# Patient Record
Sex: MALE | Race: WHITE | NOT HISPANIC OR LATINO
[De-identification: names, ages, dates, MRNs, and addresses within clinical notes are randomized per-mention and may not be internally consistent; named-entity substitution may affect disease eponyms.]

---

## 2022-04-18 ENCOUNTER — APPOINTMENT (OUTPATIENT)
Dept: CARDIOLOGY | Facility: CLINIC | Age: 72
End: 2022-04-18
Payer: MEDICARE

## 2022-04-18 ENCOUNTER — NON-APPOINTMENT (OUTPATIENT)
Age: 72
End: 2022-04-18

## 2022-04-18 PROCEDURE — 99205 OFFICE O/P NEW HI 60 MIN: CPT

## 2023-01-01 ENCOUNTER — RESULT REVIEW (OUTPATIENT)
Age: 73
End: 2023-01-01

## 2023-01-01 ENCOUNTER — LABORATORY RESULT (OUTPATIENT)
Age: 73
End: 2023-01-01

## 2023-01-01 ENCOUNTER — APPOINTMENT (OUTPATIENT)
Dept: ULTRASOUND IMAGING | Facility: IMAGING CENTER | Age: 73
End: 2023-01-01
Payer: MEDICARE

## 2023-01-01 ENCOUNTER — OUTPATIENT (OUTPATIENT)
Dept: OUTPATIENT SERVICES | Facility: HOSPITAL | Age: 73
LOS: 1 days | End: 2023-01-01

## 2023-01-01 ENCOUNTER — APPOINTMENT (OUTPATIENT)
Dept: THORACIC SURGERY | Facility: HOSPITAL | Age: 73
End: 2023-01-01

## 2023-01-01 ENCOUNTER — APPOINTMENT (OUTPATIENT)
Dept: HEMATOLOGY ONCOLOGY | Facility: CLINIC | Age: 73
End: 2023-01-01

## 2023-01-01 ENCOUNTER — NON-APPOINTMENT (OUTPATIENT)
Age: 73
End: 2023-01-01

## 2023-01-01 ENCOUNTER — OUTPATIENT (OUTPATIENT)
Dept: OUTPATIENT SERVICES | Facility: HOSPITAL | Age: 73
LOS: 1 days | End: 2023-01-01
Payer: MEDICARE

## 2023-01-01 ENCOUNTER — APPOINTMENT (OUTPATIENT)
Dept: INFUSION THERAPY | Facility: HOSPITAL | Age: 73
End: 2023-01-01

## 2023-01-01 ENCOUNTER — APPOINTMENT (OUTPATIENT)
Dept: OTOLARYNGOLOGY | Facility: CLINIC | Age: 73
End: 2023-01-01
Payer: MEDICARE

## 2023-01-01 ENCOUNTER — APPOINTMENT (OUTPATIENT)
Dept: THORACIC SURGERY | Facility: CLINIC | Age: 73
End: 2023-01-01
Payer: MEDICARE

## 2023-01-01 ENCOUNTER — APPOINTMENT (OUTPATIENT)
Dept: HEMATOLOGY ONCOLOGY | Facility: CLINIC | Age: 73
End: 2023-01-01
Payer: MEDICARE

## 2023-01-01 ENCOUNTER — APPOINTMENT (OUTPATIENT)
Dept: NUCLEAR MEDICINE | Facility: IMAGING CENTER | Age: 73
End: 2023-01-01
Payer: MEDICARE

## 2023-01-01 ENCOUNTER — APPOINTMENT (OUTPATIENT)
Dept: CT IMAGING | Facility: IMAGING CENTER | Age: 73
End: 2023-01-01
Payer: MEDICARE

## 2023-01-01 ENCOUNTER — OUTPATIENT (OUTPATIENT)
Dept: OUTPATIENT SERVICES | Facility: HOSPITAL | Age: 73
LOS: 1 days | Discharge: HOME | End: 2023-01-01
Payer: MEDICARE

## 2023-01-01 ENCOUNTER — TRANSCRIPTION ENCOUNTER (OUTPATIENT)
Age: 73
End: 2023-01-01

## 2023-01-01 ENCOUNTER — APPOINTMENT (OUTPATIENT)
Dept: MRI IMAGING | Facility: IMAGING CENTER | Age: 73
End: 2023-01-01
Payer: MEDICARE

## 2023-01-01 ENCOUNTER — APPOINTMENT (OUTPATIENT)
Dept: PULMONOLOGY | Facility: CLINIC | Age: 73
End: 2023-01-01
Payer: MEDICARE

## 2023-01-01 ENCOUNTER — APPOINTMENT (OUTPATIENT)
Dept: RADIATION ONCOLOGY | Facility: CLINIC | Age: 73
End: 2023-01-01
Payer: MEDICARE

## 2023-01-01 ENCOUNTER — APPOINTMENT (OUTPATIENT)
Dept: CARDIOLOGY | Facility: CLINIC | Age: 73
End: 2023-01-01
Payer: MEDICARE

## 2023-01-01 VITALS
WEIGHT: 155 LBS | HEIGHT: 70 IN | HEART RATE: 127 BPM | OXYGEN SATURATION: 92 % | DIASTOLIC BLOOD PRESSURE: 59 MMHG | SYSTOLIC BLOOD PRESSURE: 109 MMHG | BODY MASS INDEX: 22.19 KG/M2 | RESPIRATION RATE: 17 BRPM

## 2023-01-01 VITALS
TEMPERATURE: 97.9 F | BODY MASS INDEX: 20.58 KG/M2 | HEART RATE: 96 BPM | OXYGEN SATURATION: 98 % | WEIGHT: 155.31 LBS | DIASTOLIC BLOOD PRESSURE: 77 MMHG | RESPIRATION RATE: 18 BRPM | HEIGHT: 73 IN | SYSTOLIC BLOOD PRESSURE: 127 MMHG

## 2023-01-01 VITALS
HEIGHT: 72 IN | SYSTOLIC BLOOD PRESSURE: 102 MMHG | TEMPERATURE: 98 F | RESPIRATION RATE: 16 BRPM | OXYGEN SATURATION: 98 % | DIASTOLIC BLOOD PRESSURE: 67 MMHG | HEART RATE: 99 BPM | WEIGHT: 149.91 LBS

## 2023-01-01 VITALS
DIASTOLIC BLOOD PRESSURE: 67 MMHG | WEIGHT: 152.12 LBS | HEART RATE: 108 BPM | SYSTOLIC BLOOD PRESSURE: 111 MMHG | RESPIRATION RATE: 18 BRPM | OXYGEN SATURATION: 91 % | BODY MASS INDEX: 21.83 KG/M2 | TEMPERATURE: 97.9 F

## 2023-01-01 VITALS
WEIGHT: 151.4 LBS | SYSTOLIC BLOOD PRESSURE: 101 MMHG | BODY MASS INDEX: 21.67 KG/M2 | DIASTOLIC BLOOD PRESSURE: 65 MMHG | HEART RATE: 109 BPM | OXYGEN SATURATION: 93 % | HEIGHT: 70 IN

## 2023-01-01 VITALS
HEART RATE: 123 BPM | BODY MASS INDEX: 21.55 KG/M2 | WEIGHT: 150.55 LBS | RESPIRATION RATE: 16 BRPM | TEMPERATURE: 98.1 F | OXYGEN SATURATION: 93 % | DIASTOLIC BLOOD PRESSURE: 63 MMHG | SYSTOLIC BLOOD PRESSURE: 100 MMHG | HEIGHT: 70 IN

## 2023-01-01 VITALS
TEMPERATURE: 98.2 F | BODY MASS INDEX: 20.99 KG/M2 | OXYGEN SATURATION: 95 % | HEIGHT: 72 IN | HEART RATE: 101 BPM | SYSTOLIC BLOOD PRESSURE: 126 MMHG | WEIGHT: 155 LBS | DIASTOLIC BLOOD PRESSURE: 78 MMHG

## 2023-01-01 VITALS
TEMPERATURE: 98.4 F | HEART RATE: 107 BPM | OXYGEN SATURATION: 95 % | SYSTOLIC BLOOD PRESSURE: 137 MMHG | RESPIRATION RATE: 18 BRPM | BODY MASS INDEX: 21.08 KG/M2 | WEIGHT: 155.42 LBS | DIASTOLIC BLOOD PRESSURE: 83 MMHG

## 2023-01-01 VITALS
HEART RATE: 105 BPM | WEIGHT: 155 LBS | HEIGHT: 70 IN | BODY MASS INDEX: 22.19 KG/M2 | SYSTOLIC BLOOD PRESSURE: 106 MMHG | DIASTOLIC BLOOD PRESSURE: 65 MMHG | OXYGEN SATURATION: 90 % | RESPIRATION RATE: 16 BRPM

## 2023-01-01 VITALS
SYSTOLIC BLOOD PRESSURE: 112 MMHG | OXYGEN SATURATION: 98 % | RESPIRATION RATE: 16 BRPM | TEMPERATURE: 98 F | DIASTOLIC BLOOD PRESSURE: 72 MMHG | WEIGHT: 154.98 LBS | HEIGHT: 70.5 IN | HEART RATE: 100 BPM

## 2023-01-01 VITALS
OXYGEN SATURATION: 95 % | BODY MASS INDEX: 21.8 KG/M2 | SYSTOLIC BLOOD PRESSURE: 153 MMHG | TEMPERATURE: 98.1 F | WEIGHT: 154 LBS | DIASTOLIC BLOOD PRESSURE: 82 MMHG | HEIGHT: 70.47 IN | RESPIRATION RATE: 16 BRPM | HEART RATE: 96 BPM

## 2023-01-01 VITALS
WEIGHT: 157 LBS | HEIGHT: 72 IN | HEART RATE: 108 BPM | SYSTOLIC BLOOD PRESSURE: 123 MMHG | OXYGEN SATURATION: 92 % | RESPIRATION RATE: 18 BRPM | DIASTOLIC BLOOD PRESSURE: 72 MMHG | BODY MASS INDEX: 21.26 KG/M2 | TEMPERATURE: 97.7 F

## 2023-01-01 VITALS — HEART RATE: 107 BPM

## 2023-01-01 DIAGNOSIS — R91.1 SOLITARY PULMONARY NODULE: ICD-10-CM

## 2023-01-01 DIAGNOSIS — R22.1 LOCALIZED SWELLING, MASS AND LUMP, NECK: ICD-10-CM

## 2023-01-01 DIAGNOSIS — Z51.11 ENCOUNTER FOR ANTINEOPLASTIC CHEMOTHERAPY: ICD-10-CM

## 2023-01-01 DIAGNOSIS — R91.8 OTHER NONSPECIFIC ABNORMAL FINDING OF LUNG FIELD: ICD-10-CM

## 2023-01-01 DIAGNOSIS — J44.9 CHRONIC OBSTRUCTIVE PULMONARY DISEASE, UNSPECIFIED: ICD-10-CM

## 2023-01-01 DIAGNOSIS — Z00.00 ENCOUNTER FOR GENERAL ADULT MEDICAL EXAMINATION W/OUT ABNORMAL FINDINGS: ICD-10-CM

## 2023-01-01 DIAGNOSIS — Z98.890 OTHER SPECIFIED POSTPROCEDURAL STATES: Chronic | ICD-10-CM

## 2023-01-01 DIAGNOSIS — Z90.49 ACQUIRED ABSENCE OF OTHER SPECIFIED PARTS OF DIGESTIVE TRACT: Chronic | ICD-10-CM

## 2023-01-01 DIAGNOSIS — C80.1 MALIGNANT (PRIMARY) NEOPLASM, UNSPECIFIED: ICD-10-CM

## 2023-01-01 DIAGNOSIS — Z87.09 PERSONAL HISTORY OF OTHER DISEASES OF THE RESPIRATORY SYSTEM: ICD-10-CM

## 2023-01-01 DIAGNOSIS — F17.200 NICOTINE DEPENDENCE, UNSPECIFIED, UNCOMPLICATED: ICD-10-CM

## 2023-01-01 DIAGNOSIS — M54.9 DORSALGIA, UNSPECIFIED: ICD-10-CM

## 2023-01-01 DIAGNOSIS — G47.33 OBSTRUCTIVE SLEEP APNEA (ADULT) (PEDIATRIC): ICD-10-CM

## 2023-01-01 DIAGNOSIS — E86.0 DEHYDRATION: ICD-10-CM

## 2023-01-01 DIAGNOSIS — C07 MALIGNANT NEOPLASM OF PAROTID GLAND: ICD-10-CM

## 2023-01-01 DIAGNOSIS — Z51.89 ENCOUNTER FOR OTHER SPECIFIED AFTERCARE: ICD-10-CM

## 2023-01-01 DIAGNOSIS — R11.2 NAUSEA WITH VOMITING, UNSPECIFIED: ICD-10-CM

## 2023-01-01 DIAGNOSIS — R94.31 ABNORMAL ELECTROCARDIOGRAM [ECG] [EKG]: ICD-10-CM

## 2023-01-01 DIAGNOSIS — E78.5 HYPERLIPIDEMIA, UNSPECIFIED: ICD-10-CM

## 2023-01-01 DIAGNOSIS — Z80.3 FAMILY HISTORY OF MALIGNANT NEOPLASM OF BREAST: ICD-10-CM

## 2023-01-01 LAB
ALBUMIN SERPL ELPH-MCNC: 2.7 G/DL
ALBUMIN SERPL ELPH-MCNC: 2.7 G/DL — LOW (ref 3.3–5)
ALBUMIN SERPL ELPH-MCNC: 2.7 G/DL — LOW (ref 3.3–5)
ALBUMIN SERPL ELPH-MCNC: 2.9 G/DL — LOW (ref 3.3–5)
ALBUMIN SERPL ELPH-MCNC: 2.9 G/DL — LOW (ref 3.3–5)
ALBUMIN SERPL ELPH-MCNC: 3 G/DL — LOW (ref 3.3–5)
ALBUMIN SERPL ELPH-MCNC: 3 G/DL — LOW (ref 3.3–5)
ALBUMIN SERPL ELPH-MCNC: 3.5 G/DL — SIGNIFICANT CHANGE UP (ref 3.3–5)
ALBUMIN SERPL ELPH-MCNC: 3.6 G/DL — SIGNIFICANT CHANGE UP (ref 3.3–5)
ALBUMIN SERPL ELPH-MCNC: 3.6 G/DL — SIGNIFICANT CHANGE UP (ref 3.3–5)
ALBUMIN SERPL ELPH-MCNC: 3.7 G/DL — SIGNIFICANT CHANGE UP (ref 3.3–5)
ALBUMIN SERPL ELPH-MCNC: 3.7 G/DL — SIGNIFICANT CHANGE UP (ref 3.3–5)
ALBUMIN SERPL ELPH-MCNC: 3.8 G/DL — SIGNIFICANT CHANGE UP (ref 3.3–5)
ALBUMIN SERPL ELPH-MCNC: 3.9 G/DL
ALBUMIN SERPL ELPH-MCNC: 4 G/DL — SIGNIFICANT CHANGE UP (ref 3.3–5)
ALP BLD-CCNC: 89 U/L
ALP BLD-CCNC: 92 U/L
ALP SERPL-CCNC: 113 U/L — SIGNIFICANT CHANGE UP (ref 40–120)
ALP SERPL-CCNC: 113 U/L — SIGNIFICANT CHANGE UP (ref 40–120)
ALP SERPL-CCNC: 115 U/L — SIGNIFICANT CHANGE UP (ref 40–120)
ALP SERPL-CCNC: 115 U/L — SIGNIFICANT CHANGE UP (ref 40–120)
ALP SERPL-CCNC: 121 U/L — HIGH (ref 40–120)
ALP SERPL-CCNC: 121 U/L — HIGH (ref 40–120)
ALP SERPL-CCNC: 123 U/L — HIGH (ref 40–120)
ALP SERPL-CCNC: 129 U/L — HIGH (ref 40–120)
ALP SERPL-CCNC: 129 U/L — HIGH (ref 40–120)
ALP SERPL-CCNC: 82 U/L — SIGNIFICANT CHANGE UP (ref 40–120)
ALP SERPL-CCNC: 84 U/L — SIGNIFICANT CHANGE UP (ref 40–120)
ALT FLD-CCNC: 14 U/L — SIGNIFICANT CHANGE UP (ref 10–45)
ALT FLD-CCNC: 15 U/L — SIGNIFICANT CHANGE UP (ref 10–45)
ALT FLD-CCNC: 15 U/L — SIGNIFICANT CHANGE UP (ref 4–41)
ALT FLD-CCNC: 15 U/L — SIGNIFICANT CHANGE UP (ref 4–41)
ALT FLD-CCNC: 16 U/L — SIGNIFICANT CHANGE UP (ref 10–45)
ALT FLD-CCNC: 17 U/L — SIGNIFICANT CHANGE UP (ref 10–45)
ALT FLD-CCNC: 17 U/L — SIGNIFICANT CHANGE UP (ref 10–45)
ALT FLD-CCNC: 22 U/L — SIGNIFICANT CHANGE UP (ref 4–41)
ALT FLD-CCNC: 22 U/L — SIGNIFICANT CHANGE UP (ref 4–41)
ALT FLD-CCNC: 30 U/L — SIGNIFICANT CHANGE UP (ref 10–45)
ALT FLD-CCNC: 30 U/L — SIGNIFICANT CHANGE UP (ref 10–45)
ALT FLD-CCNC: 31 U/L — SIGNIFICANT CHANGE UP (ref 10–45)
ALT FLD-CCNC: 31 U/L — SIGNIFICANT CHANGE UP (ref 10–45)
ALT SERPL-CCNC: 15 U/L
ALT SERPL-CCNC: 22 U/L
ANION GAP SERPL CALC-SCNC: 10 MMOL/L — SIGNIFICANT CHANGE UP (ref 7–14)
ANION GAP SERPL CALC-SCNC: 10 MMOL/L — SIGNIFICANT CHANGE UP (ref 7–14)
ANION GAP SERPL CALC-SCNC: 11 MMOL/L
ANION GAP SERPL CALC-SCNC: 11 MMOL/L — SIGNIFICANT CHANGE UP (ref 7–14)
ANION GAP SERPL CALC-SCNC: 11 MMOL/L — SIGNIFICANT CHANGE UP (ref 7–14)
ANION GAP SERPL CALC-SCNC: 12 MMOL/L
ANION GAP SERPL CALC-SCNC: 12 MMOL/L — SIGNIFICANT CHANGE UP (ref 5–17)
ANION GAP SERPL CALC-SCNC: 12 MMOL/L — SIGNIFICANT CHANGE UP (ref 5–17)
ANION GAP SERPL CALC-SCNC: 13 MMOL/L — SIGNIFICANT CHANGE UP (ref 5–17)
ANION GAP SERPL CALC-SCNC: 13 MMOL/L — SIGNIFICANT CHANGE UP (ref 5–17)
ANION GAP SERPL CALC-SCNC: 8 MMOL/L — SIGNIFICANT CHANGE UP (ref 5–17)
ANION GAP SERPL CALC-SCNC: 9 MMOL/L — SIGNIFICANT CHANGE UP (ref 5–17)
ANION GAP SERPL CALC-SCNC: 9 MMOL/L — SIGNIFICANT CHANGE UP (ref 5–17)
AST SERPL-CCNC: 20 U/L — SIGNIFICANT CHANGE UP (ref 4–40)
AST SERPL-CCNC: 20 U/L — SIGNIFICANT CHANGE UP (ref 4–40)
AST SERPL-CCNC: 24 U/L — SIGNIFICANT CHANGE UP (ref 4–40)
AST SERPL-CCNC: 24 U/L — SIGNIFICANT CHANGE UP (ref 4–40)
AST SERPL-CCNC: 25 U/L
AST SERPL-CCNC: 29 U/L — SIGNIFICANT CHANGE UP (ref 10–40)
AST SERPL-CCNC: 31 U/L
AST SERPL-CCNC: 33 U/L — SIGNIFICANT CHANGE UP (ref 10–40)
AST SERPL-CCNC: 35 U/L — SIGNIFICANT CHANGE UP (ref 10–40)
AST SERPL-CCNC: 35 U/L — SIGNIFICANT CHANGE UP (ref 10–40)
AST SERPL-CCNC: 36 U/L — SIGNIFICANT CHANGE UP (ref 10–40)
AST SERPL-CCNC: 36 U/L — SIGNIFICANT CHANGE UP (ref 10–40)
AST SERPL-CCNC: 45 U/L — HIGH (ref 10–40)
BASOPHILS # BLD AUTO: 0 K/UL — SIGNIFICANT CHANGE UP (ref 0–0.2)
BASOPHILS # BLD AUTO: 0.02 K/UL — SIGNIFICANT CHANGE UP (ref 0–0.2)
BASOPHILS # BLD AUTO: 0.03 K/UL — SIGNIFICANT CHANGE UP (ref 0–0.2)
BASOPHILS # BLD AUTO: 0.06 K/UL — SIGNIFICANT CHANGE UP (ref 0–0.2)
BASOPHILS # BLD AUTO: 0.06 K/UL — SIGNIFICANT CHANGE UP (ref 0–0.2)
BASOPHILS NFR BLD AUTO: 0 % — SIGNIFICANT CHANGE UP (ref 0–2)
BASOPHILS NFR BLD AUTO: 0.2 % — SIGNIFICANT CHANGE UP (ref 0–2)
BASOPHILS NFR BLD AUTO: 0.3 % — SIGNIFICANT CHANGE UP (ref 0–2)
BASOPHILS NFR BLD AUTO: 0.4 % — SIGNIFICANT CHANGE UP (ref 0–2)
BASOPHILS NFR BLD AUTO: 0.4 % — SIGNIFICANT CHANGE UP (ref 0–2)
BILIRUB DIRECT SERPL-MCNC: 0.1 MG/DL — SIGNIFICANT CHANGE UP (ref 0–0.3)
BILIRUB DIRECT SERPL-MCNC: 0.2 MG/DL — SIGNIFICANT CHANGE UP (ref 0–0.3)
BILIRUB DIRECT SERPL-MCNC: 0.2 MG/DL — SIGNIFICANT CHANGE UP (ref 0–0.3)
BILIRUB INDIRECT FLD-MCNC: 0.1 MG/DL — LOW (ref 0.2–1.2)
BILIRUB SERPL-MCNC: 0.2 MG/DL
BILIRUB SERPL-MCNC: 0.2 MG/DL — SIGNIFICANT CHANGE UP (ref 0.2–1.2)
BILIRUB SERPL-MCNC: 0.3 MG/DL — SIGNIFICANT CHANGE UP (ref 0.2–1.2)
BILIRUB SERPL-MCNC: 0.4 MG/DL
BILIRUB SERPL-MCNC: 0.4 MG/DL — SIGNIFICANT CHANGE UP (ref 0.2–1.2)
BILIRUB SERPL-MCNC: 0.4 MG/DL — SIGNIFICANT CHANGE UP (ref 0.2–1.2)
BLD GP AB SCN SERPL QL: NEGATIVE — SIGNIFICANT CHANGE UP
BUN SERPL-MCNC: 17 MG/DL — SIGNIFICANT CHANGE UP (ref 7–23)
BUN SERPL-MCNC: 17 MG/DL — SIGNIFICANT CHANGE UP (ref 7–23)
BUN SERPL-MCNC: 18 MG/DL
BUN SERPL-MCNC: 18 MG/DL — SIGNIFICANT CHANGE UP (ref 7–23)
BUN SERPL-MCNC: 18 MG/DL — SIGNIFICANT CHANGE UP (ref 7–23)
BUN SERPL-MCNC: 21 MG/DL — SIGNIFICANT CHANGE UP (ref 7–23)
BUN SERPL-MCNC: 30 MG/DL — HIGH (ref 7–23)
BUN SERPL-MCNC: 31 MG/DL
BUN SERPL-MCNC: 38 MG/DL — HIGH (ref 7–23)
BUN SERPL-MCNC: 38 MG/DL — HIGH (ref 7–23)
BURR CELLS BLD QL SMEAR: PRESENT — SIGNIFICANT CHANGE UP
BURR CELLS BLD QL SMEAR: PRESENT — SIGNIFICANT CHANGE UP
CALCIT SERPL-MCNC: 4.2 PG/ML — SIGNIFICANT CHANGE UP
CALCIT SERPL-MCNC: 4.2 PG/ML — SIGNIFICANT CHANGE UP
CALCIUM SERPL-MCNC: 8.5 MG/DL
CALCIUM SERPL-MCNC: 8.7 MG/DL — SIGNIFICANT CHANGE UP (ref 8.4–10.5)
CALCIUM SERPL-MCNC: 8.7 MG/DL — SIGNIFICANT CHANGE UP (ref 8.4–10.5)
CALCIUM SERPL-MCNC: 8.8 MG/DL — SIGNIFICANT CHANGE UP (ref 8.4–10.5)
CALCIUM SERPL-MCNC: 8.8 MG/DL — SIGNIFICANT CHANGE UP (ref 8.4–10.5)
CALCIUM SERPL-MCNC: 8.9 MG/DL — SIGNIFICANT CHANGE UP (ref 8.4–10.5)
CALCIUM SERPL-MCNC: 8.9 MG/DL — SIGNIFICANT CHANGE UP (ref 8.4–10.5)
CALCIUM SERPL-MCNC: 9 MG/DL — SIGNIFICANT CHANGE UP (ref 8.4–10.5)
CALCIUM SERPL-MCNC: 9 MG/DL — SIGNIFICANT CHANGE UP (ref 8.4–10.5)
CALCIUM SERPL-MCNC: 9.1 MG/DL
CALCIUM SERPL-MCNC: 9.2 MG/DL — SIGNIFICANT CHANGE UP (ref 8.4–10.5)
CALCIUM SERPL-MCNC: 9.3 MG/DL — SIGNIFICANT CHANGE UP (ref 8.4–10.5)
CALCIUM SERPL-MCNC: 9.3 MG/DL — SIGNIFICANT CHANGE UP (ref 8.4–10.5)
CEA SERPL-MCNC: 2.3 NG/ML — SIGNIFICANT CHANGE UP (ref 0–3.8)
CEA SERPL-MCNC: 2.7 NG/ML — SIGNIFICANT CHANGE UP (ref 0–3.8)
CEA SERPL-MCNC: 2.7 NG/ML — SIGNIFICANT CHANGE UP (ref 0–3.8)
CEA SERPL-MCNC: 2.9 NG/ML
CEA SERPL-MCNC: 3 NG/ML — SIGNIFICANT CHANGE UP (ref 0–3.8)
CEA SERPL-MCNC: 3 NG/ML — SIGNIFICANT CHANGE UP (ref 0–3.8)
CHLORIDE SERPL-SCNC: 102 MMOL/L
CHLORIDE SERPL-SCNC: 103 MMOL/L — SIGNIFICANT CHANGE UP (ref 96–108)
CHLORIDE SERPL-SCNC: 104 MMOL/L — SIGNIFICANT CHANGE UP (ref 96–108)
CHLORIDE SERPL-SCNC: 104 MMOL/L — SIGNIFICANT CHANGE UP (ref 96–108)
CHLORIDE SERPL-SCNC: 104 MMOL/L — SIGNIFICANT CHANGE UP (ref 98–107)
CHLORIDE SERPL-SCNC: 104 MMOL/L — SIGNIFICANT CHANGE UP (ref 98–107)
CHLORIDE SERPL-SCNC: 105 MMOL/L — SIGNIFICANT CHANGE UP (ref 96–108)
CHLORIDE SERPL-SCNC: 97 MMOL/L
CHLORIDE SERPL-SCNC: 98 MMOL/L — SIGNIFICANT CHANGE UP (ref 98–107)
CHLORIDE SERPL-SCNC: 98 MMOL/L — SIGNIFICANT CHANGE UP (ref 98–107)
CHROM ANALY INTERPHASE BLD FISH-IMP: SIGNIFICANT CHANGE UP
CHROM ANALY INTERPHASE BLD FISH-IMP: SIGNIFICANT CHANGE UP
CO2 SERPL-SCNC: 21 MMOL/L — LOW (ref 22–31)
CO2 SERPL-SCNC: 22 MMOL/L
CO2 SERPL-SCNC: 23 MMOL/L
CO2 SERPL-SCNC: 23 MMOL/L — SIGNIFICANT CHANGE UP (ref 22–31)
CO2 SERPL-SCNC: 24 MMOL/L — SIGNIFICANT CHANGE UP (ref 22–31)
CREAT SERPL-MCNC: 1.04 MG/DL — SIGNIFICANT CHANGE UP (ref 0.5–1.3)
CREAT SERPL-MCNC: 1.04 MG/DL — SIGNIFICANT CHANGE UP (ref 0.5–1.3)
CREAT SERPL-MCNC: 1.23 MG/DL — SIGNIFICANT CHANGE UP (ref 0.5–1.3)
CREAT SERPL-MCNC: 1.26 MG/DL
CREAT SERPL-MCNC: 1.27 MG/DL — SIGNIFICANT CHANGE UP (ref 0.5–1.3)
CREAT SERPL-MCNC: 1.27 MG/DL — SIGNIFICANT CHANGE UP (ref 0.5–1.3)
CREAT SERPL-MCNC: 1.29 MG/DL — SIGNIFICANT CHANGE UP (ref 0.5–1.3)
CREAT SERPL-MCNC: 1.29 MG/DL — SIGNIFICANT CHANGE UP (ref 0.5–1.3)
CREAT SERPL-MCNC: 1.44 MG/DL
CREAT SERPL-MCNC: 1.46 MG/DL — HIGH (ref 0.5–1.3)
CREAT SERPL-MCNC: 1.46 MG/DL — HIGH (ref 0.5–1.3)
CREAT SERPL-MCNC: 1.5 MG/DL — HIGH (ref 0.5–1.3)
CREAT SERPL-MCNC: 1.5 MG/DL — HIGH (ref 0.5–1.3)
EGFR: 49 ML/MIN/1.73M2 — LOW
EGFR: 49 ML/MIN/1.73M2 — LOW
EGFR: 50 ML/MIN/1.73M2 — LOW
EGFR: 50 ML/MIN/1.73M2 — LOW
EGFR: 51 ML/MIN/1.73M2
EGFR: 59 ML/MIN/1.73M2 — LOW
EGFR: 59 ML/MIN/1.73M2 — LOW
EGFR: 60 ML/MIN/1.73M2
EGFR: 60 ML/MIN/1.73M2 — SIGNIFICANT CHANGE UP
EGFR: 60 ML/MIN/1.73M2 — SIGNIFICANT CHANGE UP
EGFR: 62 ML/MIN/1.73M2 — SIGNIFICANT CHANGE UP
EGFR: 76 ML/MIN/1.73M2 — SIGNIFICANT CHANGE UP
EGFR: 76 ML/MIN/1.73M2 — SIGNIFICANT CHANGE UP
EOSINOPHIL # BLD AUTO: 0 K/UL — SIGNIFICANT CHANGE UP (ref 0–0.5)
EOSINOPHIL # BLD AUTO: 0 K/UL — SIGNIFICANT CHANGE UP (ref 0–0.5)
EOSINOPHIL # BLD AUTO: 0.02 K/UL — SIGNIFICANT CHANGE UP (ref 0–0.5)
EOSINOPHIL # BLD AUTO: 0.02 K/UL — SIGNIFICANT CHANGE UP (ref 0–0.5)
EOSINOPHIL # BLD AUTO: 0.12 K/UL — SIGNIFICANT CHANGE UP (ref 0–0.5)
EOSINOPHIL # BLD AUTO: 0.13 K/UL — SIGNIFICANT CHANGE UP (ref 0–0.5)
EOSINOPHIL # BLD AUTO: 0.14 K/UL — SIGNIFICANT CHANGE UP (ref 0–0.5)
EOSINOPHIL # BLD AUTO: 0.14 K/UL — SIGNIFICANT CHANGE UP (ref 0–0.5)
EOSINOPHIL # BLD AUTO: 0.25 K/UL — SIGNIFICANT CHANGE UP (ref 0–0.5)
EOSINOPHIL # BLD AUTO: 0.25 K/UL — SIGNIFICANT CHANGE UP (ref 0–0.5)
EOSINOPHIL NFR BLD AUTO: 0 % — SIGNIFICANT CHANGE UP (ref 0–6)
EOSINOPHIL NFR BLD AUTO: 0 % — SIGNIFICANT CHANGE UP (ref 0–6)
EOSINOPHIL NFR BLD AUTO: 1 % — SIGNIFICANT CHANGE UP (ref 0–6)
EOSINOPHIL NFR BLD AUTO: 1 % — SIGNIFICANT CHANGE UP (ref 0–6)
EOSINOPHIL NFR BLD AUTO: 1.2 % — SIGNIFICANT CHANGE UP (ref 0–6)
EOSINOPHIL NFR BLD AUTO: 1.2 % — SIGNIFICANT CHANGE UP (ref 0–6)
EOSINOPHIL NFR BLD AUTO: 1.4 % — SIGNIFICANT CHANGE UP (ref 0–6)
EOSINOPHIL NFR BLD AUTO: 1.6 % — SIGNIFICANT CHANGE UP (ref 0–6)
GLUCOSE SERPL-MCNC: 100 MG/DL — HIGH (ref 70–99)
GLUCOSE SERPL-MCNC: 100 MG/DL — HIGH (ref 70–99)
GLUCOSE SERPL-MCNC: 107 MG/DL
GLUCOSE SERPL-MCNC: 121 MG/DL — HIGH (ref 70–99)
GLUCOSE SERPL-MCNC: 121 MG/DL — HIGH (ref 70–99)
GLUCOSE SERPL-MCNC: 53 MG/DL — CRITICAL LOW (ref 70–99)
GLUCOSE SERPL-MCNC: 53 MG/DL — CRITICAL LOW (ref 70–99)
GLUCOSE SERPL-MCNC: 75 MG/DL — SIGNIFICANT CHANGE UP (ref 70–99)
GLUCOSE SERPL-MCNC: 76 MG/DL — SIGNIFICANT CHANGE UP (ref 70–99)
GLUCOSE SERPL-MCNC: 76 MG/DL — SIGNIFICANT CHANGE UP (ref 70–99)
GLUCOSE SERPL-MCNC: 85 MG/DL
GLUCOSE SERPL-MCNC: 92 MG/DL — SIGNIFICANT CHANGE UP (ref 70–99)
GLUCOSE SERPL-MCNC: 92 MG/DL — SIGNIFICANT CHANGE UP (ref 70–99)
GLUCOSE SERPL-MCNC: 93 MG/DL — SIGNIFICANT CHANGE UP (ref 70–99)
GLUCOSE SERPL-MCNC: 93 MG/DL — SIGNIFICANT CHANGE UP (ref 70–99)
HBV CORE AB SER-ACNC: SIGNIFICANT CHANGE UP
HBV DNA # SERPL NAA+PROBE: SIGNIFICANT CHANGE UP
HBV DNA # SERPL NAA+PROBE: SIGNIFICANT CHANGE UP
HBV DNA SERPL NAA+PROBE-LOG#: SIGNIFICANT CHANGE UP LOGIU/ML
HBV DNA SERPL NAA+PROBE-LOG#: SIGNIFICANT CHANGE UP LOGIU/ML
HBV SURFACE AB SER-ACNC: SIGNIFICANT CHANGE UP
HBV SURFACE AG SER-ACNC: ABNORMAL
HCT VFR BLD CALC: 21.5 % — LOW (ref 39–50)
HCT VFR BLD CALC: 21.5 % — LOW (ref 39–50)
HCT VFR BLD CALC: 24 % — LOW (ref 39–50)
HCT VFR BLD CALC: 24 % — LOW (ref 39–50)
HCT VFR BLD CALC: 25.3 % — LOW (ref 39–50)
HCT VFR BLD CALC: 25.3 % — LOW (ref 39–50)
HCT VFR BLD CALC: 27.5 % — LOW (ref 39–50)
HCT VFR BLD CALC: 27.5 % — LOW (ref 39–50)
HCT VFR BLD CALC: 31 % — LOW (ref 39–50)
HCT VFR BLD CALC: 31 % — LOW (ref 39–50)
HCT VFR BLD CALC: 32 % — LOW (ref 39–50)
HCT VFR BLD CALC: 32 % — LOW (ref 39–50)
HCT VFR BLD CALC: 36.9 % — LOW (ref 39–50)
HCT VFR BLD CALC: 37.9 % — LOW (ref 39–50)
HCV AB S/CO SERPL IA: 0.39 S/CO — SIGNIFICANT CHANGE UP (ref 0–0.99)
HCV AB SERPL-IMP: SIGNIFICANT CHANGE UP
HGB BLD-MCNC: 10.3 G/DL — LOW (ref 13–17)
HGB BLD-MCNC: 10.3 G/DL — LOW (ref 13–17)
HGB BLD-MCNC: 10.4 G/DL — LOW (ref 13–17)
HGB BLD-MCNC: 10.4 G/DL — LOW (ref 13–17)
HGB BLD-MCNC: 11.8 G/DL — LOW (ref 13–17)
HGB BLD-MCNC: 12.3 G/DL — LOW (ref 13–17)
HGB BLD-MCNC: 7.3 G/DL — LOW (ref 13–17)
HGB BLD-MCNC: 7.3 G/DL — LOW (ref 13–17)
HGB BLD-MCNC: 7.9 G/DL — LOW (ref 13–17)
HGB BLD-MCNC: 7.9 G/DL — LOW (ref 13–17)
HGB BLD-MCNC: 8.2 G/DL — LOW (ref 13–17)
HGB BLD-MCNC: 8.2 G/DL — LOW (ref 13–17)
HGB BLD-MCNC: 9.4 G/DL — LOW (ref 13–17)
HGB BLD-MCNC: 9.4 G/DL — LOW (ref 13–17)
IMM GRANULOCYTES NFR BLD AUTO: 0.2 % — SIGNIFICANT CHANGE UP (ref 0–0.9)
IMM GRANULOCYTES NFR BLD AUTO: 0.5 % — SIGNIFICANT CHANGE UP (ref 0–0.9)
IMM GRANULOCYTES NFR BLD AUTO: 1.7 % — HIGH (ref 0–0.9)
IMM GRANULOCYTES NFR BLD AUTO: 1.7 % — HIGH (ref 0–0.9)
IMM GRANULOCYTES NFR BLD AUTO: 1.8 % — HIGH (ref 0–0.9)
IMM GRANULOCYTES NFR BLD AUTO: 1.8 % — HIGH (ref 0–0.9)
INR PPP: 0.89 RATIO
LDH SERPL L TO P-CCNC: 188 U/L — SIGNIFICANT CHANGE UP (ref 50–242)
LDH SERPL L TO P-CCNC: 188 U/L — SIGNIFICANT CHANGE UP (ref 50–242)
LDH SERPL L TO P-CCNC: 217 U/L — SIGNIFICANT CHANGE UP (ref 50–242)
LDH SERPL L TO P-CCNC: 217 U/L — SIGNIFICANT CHANGE UP (ref 50–242)
LDH SERPL L TO P-CCNC: 239 U/L — SIGNIFICANT CHANGE UP (ref 50–242)
LDH SERPL L TO P-CCNC: 239 U/L — SIGNIFICANT CHANGE UP (ref 50–242)
LDH SERPL L TO P-CCNC: 421 U/L — HIGH (ref 50–242)
LDH SERPL-CCNC: 125 U/L
LDH SERPL-CCNC: 391 U/L
LYMPHOCYTES # BLD AUTO: 0.45 K/UL — LOW (ref 1–3.3)
LYMPHOCYTES # BLD AUTO: 0.45 K/UL — LOW (ref 1–3.3)
LYMPHOCYTES # BLD AUTO: 13 % — SIGNIFICANT CHANGE UP (ref 13–44)
LYMPHOCYTES # BLD AUTO: 13 % — SIGNIFICANT CHANGE UP (ref 13–44)
LYMPHOCYTES # BLD AUTO: 19.2 % — SIGNIFICANT CHANGE UP (ref 13–44)
LYMPHOCYTES # BLD AUTO: 19.2 % — SIGNIFICANT CHANGE UP (ref 13–44)
LYMPHOCYTES # BLD AUTO: 2.31 K/UL — SIGNIFICANT CHANGE UP (ref 1–3.3)
LYMPHOCYTES # BLD AUTO: 2.31 K/UL — SIGNIFICANT CHANGE UP (ref 1–3.3)
LYMPHOCYTES # BLD AUTO: 2.68 K/UL — SIGNIFICANT CHANGE UP (ref 1–3.3)
LYMPHOCYTES # BLD AUTO: 2.74 K/UL — SIGNIFICANT CHANGE UP (ref 1–3.3)
LYMPHOCYTES # BLD AUTO: 2.74 K/UL — SIGNIFICANT CHANGE UP (ref 1–3.3)
LYMPHOCYTES # BLD AUTO: 2.92 K/UL — SIGNIFICANT CHANGE UP (ref 1–3.3)
LYMPHOCYTES # BLD AUTO: 20.4 % — SIGNIFICANT CHANGE UP (ref 13–44)
LYMPHOCYTES # BLD AUTO: 20.4 % — SIGNIFICANT CHANGE UP (ref 13–44)
LYMPHOCYTES # BLD AUTO: 21 % — SIGNIFICANT CHANGE UP (ref 13–44)
LYMPHOCYTES # BLD AUTO: 21 % — SIGNIFICANT CHANGE UP (ref 13–44)
LYMPHOCYTES # BLD AUTO: 3.09 K/UL — SIGNIFICANT CHANGE UP (ref 1–3.3)
LYMPHOCYTES # BLD AUTO: 3.09 K/UL — SIGNIFICANT CHANGE UP (ref 1–3.3)
LYMPHOCYTES # BLD AUTO: 32.3 % — SIGNIFICANT CHANGE UP (ref 13–44)
LYMPHOCYTES # BLD AUTO: 34 % — SIGNIFICANT CHANGE UP (ref 13–44)
MACROCYTES BLD QL: SLIGHT — SIGNIFICANT CHANGE UP
MACROCYTES BLD QL: SLIGHT — SIGNIFICANT CHANGE UP
MAGNESIUM SERPL-MCNC: 1.7 MG/DL
MAGNESIUM SERPL-MCNC: 1.8 MG/DL — SIGNIFICANT CHANGE UP (ref 1.6–2.6)
MAGNESIUM SERPL-MCNC: 1.8 MG/DL — SIGNIFICANT CHANGE UP (ref 1.6–2.6)
MAGNESIUM SERPL-MCNC: 1.9 MG/DL
MAGNESIUM SERPL-MCNC: 1.9 MG/DL — SIGNIFICANT CHANGE UP (ref 1.6–2.6)
MAGNESIUM SERPL-MCNC: 1.9 MG/DL — SIGNIFICANT CHANGE UP (ref 1.6–2.6)
MAGNESIUM SERPL-MCNC: 2 MG/DL — SIGNIFICANT CHANGE UP (ref 1.6–2.6)
MCHC RBC-ENTMCNC: 32 G/DL — SIGNIFICANT CHANGE UP (ref 32–36)
MCHC RBC-ENTMCNC: 32.2 GM/DL — SIGNIFICANT CHANGE UP (ref 32–36)
MCHC RBC-ENTMCNC: 32.2 GM/DL — SIGNIFICANT CHANGE UP (ref 32–36)
MCHC RBC-ENTMCNC: 32.4 GM/DL — SIGNIFICANT CHANGE UP (ref 32–36)
MCHC RBC-ENTMCNC: 32.4 GM/DL — SIGNIFICANT CHANGE UP (ref 32–36)
MCHC RBC-ENTMCNC: 32.5 G/DL — SIGNIFICANT CHANGE UP (ref 32–36)
MCHC RBC-ENTMCNC: 32.5 PG — SIGNIFICANT CHANGE UP (ref 27–34)
MCHC RBC-ENTMCNC: 32.5 PG — SIGNIFICANT CHANGE UP (ref 27–34)
MCHC RBC-ENTMCNC: 32.6 PG — SIGNIFICANT CHANGE UP (ref 27–34)
MCHC RBC-ENTMCNC: 32.6 PG — SIGNIFICANT CHANGE UP (ref 27–34)
MCHC RBC-ENTMCNC: 32.9 G/DL — SIGNIFICANT CHANGE UP (ref 32–36)
MCHC RBC-ENTMCNC: 32.9 G/DL — SIGNIFICANT CHANGE UP (ref 32–36)
MCHC RBC-ENTMCNC: 33.5 G/DL — SIGNIFICANT CHANGE UP (ref 32–36)
MCHC RBC-ENTMCNC: 33.5 G/DL — SIGNIFICANT CHANGE UP (ref 32–36)
MCHC RBC-ENTMCNC: 33.6 PG — SIGNIFICANT CHANGE UP (ref 27–34)
MCHC RBC-ENTMCNC: 33.9 PG — SIGNIFICANT CHANGE UP (ref 27–34)
MCHC RBC-ENTMCNC: 34 G/DL — SIGNIFICANT CHANGE UP (ref 32–36)
MCHC RBC-ENTMCNC: 34 G/DL — SIGNIFICANT CHANGE UP (ref 32–36)
MCHC RBC-ENTMCNC: 34.2 G/DL — SIGNIFICANT CHANGE UP (ref 32–36)
MCHC RBC-ENTMCNC: 34.2 G/DL — SIGNIFICANT CHANGE UP (ref 32–36)
MCHC RBC-ENTMCNC: 34.7 PG — HIGH (ref 27–34)
MCHC RBC-ENTMCNC: 34.7 PG — HIGH (ref 27–34)
MCHC RBC-ENTMCNC: 34.8 PG — HIGH (ref 27–34)
MCHC RBC-ENTMCNC: 34.8 PG — HIGH (ref 27–34)
MCHC RBC-ENTMCNC: 35.1 PG — HIGH (ref 27–34)
MCHC RBC-ENTMCNC: 35.1 PG — HIGH (ref 27–34)
MCHC RBC-ENTMCNC: 35.3 PG — HIGH (ref 27–34)
MCHC RBC-ENTMCNC: 35.3 PG — HIGH (ref 27–34)
MCV RBC AUTO: 100.4 FL — HIGH (ref 80–100)
MCV RBC AUTO: 100.4 FL — HIGH (ref 80–100)
MCV RBC AUTO: 103.4 FL — HIGH (ref 80–100)
MCV RBC AUTO: 103.7 FL — HIGH (ref 80–100)
MCV RBC AUTO: 103.7 FL — HIGH (ref 80–100)
MCV RBC AUTO: 104.4 FL — HIGH (ref 80–100)
MCV RBC AUTO: 105.1 FL — HIGH (ref 80–100)
MCV RBC AUTO: 107.7 FL — HIGH (ref 80–100)
MCV RBC AUTO: 107.7 FL — HIGH (ref 80–100)
MCV RBC AUTO: 99.2 FL — SIGNIFICANT CHANGE UP (ref 80–100)
MCV RBC AUTO: 99.2 FL — SIGNIFICANT CHANGE UP (ref 80–100)
MONOCYTES # BLD AUTO: 0.24 K/UL — SIGNIFICANT CHANGE UP (ref 0–0.9)
MONOCYTES # BLD AUTO: 0.24 K/UL — SIGNIFICANT CHANGE UP (ref 0–0.9)
MONOCYTES # BLD AUTO: 0.65 K/UL — SIGNIFICANT CHANGE UP (ref 0–0.9)
MONOCYTES # BLD AUTO: 0.69 K/UL — SIGNIFICANT CHANGE UP (ref 0–0.9)
MONOCYTES # BLD AUTO: 1.03 K/UL — HIGH (ref 0–0.9)
MONOCYTES # BLD AUTO: 1.03 K/UL — HIGH (ref 0–0.9)
MONOCYTES # BLD AUTO: 1.41 K/UL — HIGH (ref 0–0.9)
MONOCYTES # BLD AUTO: 1.41 K/UL — HIGH (ref 0–0.9)
MONOCYTES # BLD AUTO: 2.11 K/UL — HIGH (ref 0–0.9)
MONOCYTES # BLD AUTO: 2.11 K/UL — HIGH (ref 0–0.9)
MONOCYTES NFR BLD AUTO: 10 % — SIGNIFICANT CHANGE UP (ref 2–14)
MONOCYTES NFR BLD AUTO: 10 % — SIGNIFICANT CHANGE UP (ref 2–14)
MONOCYTES NFR BLD AUTO: 11 % — SIGNIFICANT CHANGE UP (ref 2–14)
MONOCYTES NFR BLD AUTO: 11 % — SIGNIFICANT CHANGE UP (ref 2–14)
MONOCYTES NFR BLD AUTO: 7.6 % — SIGNIFICANT CHANGE UP (ref 2–14)
MONOCYTES NFR BLD AUTO: 8.3 % — SIGNIFICANT CHANGE UP (ref 2–14)
MONOCYTES NFR BLD AUTO: 8.8 % — SIGNIFICANT CHANGE UP (ref 2–14)
MONOCYTES NFR BLD AUTO: 8.8 % — SIGNIFICANT CHANGE UP (ref 2–14)
MONOCYTES NFR BLD AUTO: 9.1 % — SIGNIFICANT CHANGE UP (ref 2–14)
MONOCYTES NFR BLD AUTO: 9.1 % — SIGNIFICANT CHANGE UP (ref 2–14)
MYELOCYTES NFR BLD: 1 % — HIGH (ref 0–0)
MYELOCYTES NFR BLD: 1 % — HIGH (ref 0–0)
NEUTROPHILS # BLD AUTO: 1.43 K/UL — LOW (ref 1.8–7.4)
NEUTROPHILS # BLD AUTO: 1.43 K/UL — LOW (ref 1.8–7.4)
NEUTROPHILS # BLD AUTO: 11.01 K/UL — HIGH (ref 1.8–7.4)
NEUTROPHILS # BLD AUTO: 11.01 K/UL — HIGH (ref 1.8–7.4)
NEUTROPHILS # BLD AUTO: 16.23 K/UL — HIGH (ref 1.8–7.4)
NEUTROPHILS # BLD AUTO: 16.23 K/UL — HIGH (ref 1.8–7.4)
NEUTROPHILS # BLD AUTO: 4.77 K/UL — SIGNIFICANT CHANGE UP (ref 1.8–7.4)
NEUTROPHILS # BLD AUTO: 4.84 K/UL — SIGNIFICANT CHANGE UP (ref 1.8–7.4)
NEUTROPHILS # BLD AUTO: 7.64 K/UL — HIGH (ref 1.8–7.4)
NEUTROPHILS # BLD AUTO: 7.64 K/UL — HIGH (ref 1.8–7.4)
NEUTROPHILS NFR BLD AUTO: 56.2 % — SIGNIFICANT CHANGE UP (ref 43–77)
NEUTROPHILS NFR BLD AUTO: 57.4 % — SIGNIFICANT CHANGE UP (ref 43–77)
NEUTROPHILS NFR BLD AUTO: 66 % — SIGNIFICANT CHANGE UP (ref 43–77)
NEUTROPHILS NFR BLD AUTO: 66 % — SIGNIFICANT CHANGE UP (ref 43–77)
NEUTROPHILS NFR BLD AUTO: 67.3 % — SIGNIFICANT CHANGE UP (ref 43–77)
NEUTROPHILS NFR BLD AUTO: 67.3 % — SIGNIFICANT CHANGE UP (ref 43–77)
NEUTROPHILS NFR BLD AUTO: 68.2 % — SIGNIFICANT CHANGE UP (ref 43–77)
NEUTROPHILS NFR BLD AUTO: 68.2 % — SIGNIFICANT CHANGE UP (ref 43–77)
NEUTROPHILS NFR BLD AUTO: 77 % — SIGNIFICANT CHANGE UP (ref 43–77)
NEUTROPHILS NFR BLD AUTO: 77 % — SIGNIFICANT CHANGE UP (ref 43–77)
NON-GYNECOLOGICAL CYTOLOGY STUDY: SIGNIFICANT CHANGE UP
NRBC # BLD: 0 /100 WBCS — SIGNIFICANT CHANGE UP (ref 0–0)
NRBC # BLD: 0 /100 — SIGNIFICANT CHANGE UP (ref 0–0)
NRBC # BLD: SIGNIFICANT CHANGE UP /100 WBCS (ref 0–0)
NRBC # FLD: 0 K/UL — SIGNIFICANT CHANGE UP (ref 0–0)
PHOSPHATE SERPL-MCNC: 3.6 MG/DL
PLAT MORPH BLD: NORMAL — SIGNIFICANT CHANGE UP
PLATELET # BLD AUTO: 11 K/UL — CRITICAL LOW (ref 150–400)
PLATELET # BLD AUTO: 11 K/UL — CRITICAL LOW (ref 150–400)
PLATELET # BLD AUTO: 12 K/UL — CRITICAL LOW (ref 150–400)
PLATELET # BLD AUTO: 12 K/UL — CRITICAL LOW (ref 150–400)
PLATELET # BLD AUTO: 171 K/UL — SIGNIFICANT CHANGE UP (ref 150–400)
PLATELET # BLD AUTO: 171 K/UL — SIGNIFICANT CHANGE UP (ref 150–400)
PLATELET # BLD AUTO: 197 K/UL — SIGNIFICANT CHANGE UP (ref 150–400)
PLATELET # BLD AUTO: 202 K/UL — SIGNIFICANT CHANGE UP (ref 150–400)
PLATELET # BLD AUTO: 253 K/UL — SIGNIFICANT CHANGE UP (ref 150–400)
PLATELET # BLD AUTO: 253 K/UL — SIGNIFICANT CHANGE UP (ref 150–400)
PLATELET # BLD AUTO: 366 K/UL — SIGNIFICANT CHANGE UP (ref 150–400)
PLATELET # BLD AUTO: 366 K/UL — SIGNIFICANT CHANGE UP (ref 150–400)
PLATELET # BLD AUTO: 367 K/UL — SIGNIFICANT CHANGE UP (ref 150–400)
PLATELET # BLD AUTO: 367 K/UL — SIGNIFICANT CHANGE UP (ref 150–400)
POTASSIUM SERPL-MCNC: 4.5 MMOL/L — SIGNIFICANT CHANGE UP (ref 3.5–5.3)
POTASSIUM SERPL-MCNC: 4.6 MMOL/L — SIGNIFICANT CHANGE UP (ref 3.5–5.3)
POTASSIUM SERPL-MCNC: 4.6 MMOL/L — SIGNIFICANT CHANGE UP (ref 3.5–5.3)
POTASSIUM SERPL-MCNC: 4.9 MMOL/L — SIGNIFICANT CHANGE UP (ref 3.5–5.3)
POTASSIUM SERPL-MCNC: 4.9 MMOL/L — SIGNIFICANT CHANGE UP (ref 3.5–5.3)
POTASSIUM SERPL-MCNC: 5.3 MMOL/L — SIGNIFICANT CHANGE UP (ref 3.5–5.3)
POTASSIUM SERPL-MCNC: 5.3 MMOL/L — SIGNIFICANT CHANGE UP (ref 3.5–5.3)
POTASSIUM SERPL-MCNC: 6 MMOL/L — HIGH (ref 3.5–5.3)
POTASSIUM SERPL-MCNC: 6 MMOL/L — HIGH (ref 3.5–5.3)
POTASSIUM SERPL-SCNC: 4.5 MMOL/L — SIGNIFICANT CHANGE UP (ref 3.5–5.3)
POTASSIUM SERPL-SCNC: 4.6 MMOL/L — SIGNIFICANT CHANGE UP (ref 3.5–5.3)
POTASSIUM SERPL-SCNC: 4.6 MMOL/L — SIGNIFICANT CHANGE UP (ref 3.5–5.3)
POTASSIUM SERPL-SCNC: 4.7 MMOL/L
POTASSIUM SERPL-SCNC: 4.7 MMOL/L
POTASSIUM SERPL-SCNC: 4.9 MMOL/L — SIGNIFICANT CHANGE UP (ref 3.5–5.3)
POTASSIUM SERPL-SCNC: 4.9 MMOL/L — SIGNIFICANT CHANGE UP (ref 3.5–5.3)
POTASSIUM SERPL-SCNC: 5.3 MMOL/L — SIGNIFICANT CHANGE UP (ref 3.5–5.3)
POTASSIUM SERPL-SCNC: 5.3 MMOL/L — SIGNIFICANT CHANGE UP (ref 3.5–5.3)
POTASSIUM SERPL-SCNC: 6 MMOL/L — HIGH (ref 3.5–5.3)
POTASSIUM SERPL-SCNC: 6 MMOL/L — HIGH (ref 3.5–5.3)
PROT SERPL-MCNC: 5.5 G/DL
PROT SERPL-MCNC: 6 G/DL — SIGNIFICANT CHANGE UP (ref 6–8.3)
PROT SERPL-MCNC: 6 G/DL — SIGNIFICANT CHANGE UP (ref 6–8.3)
PROT SERPL-MCNC: 6.5 G/DL — SIGNIFICANT CHANGE UP (ref 6–8.3)
PROT SERPL-MCNC: 6.5 G/DL — SIGNIFICANT CHANGE UP (ref 6–8.3)
PROT SERPL-MCNC: 6.7 G/DL — SIGNIFICANT CHANGE UP (ref 6–8.3)
PROT SERPL-MCNC: 6.7 G/DL — SIGNIFICANT CHANGE UP (ref 6–8.3)
PROT SERPL-MCNC: 7 G/DL — SIGNIFICANT CHANGE UP (ref 6–8.3)
PROT SERPL-MCNC: 7.2 G/DL
PROT SERPL-MCNC: 7.3 G/DL — SIGNIFICANT CHANGE UP (ref 6–8.3)
PROT SERPL-MCNC: 7.7 G/DL — SIGNIFICANT CHANGE UP (ref 6–8.3)
PT BLD: 10.1 SEC
RBC # BLD: 2.08 M/UL — LOW (ref 4.2–5.8)
RBC # BLD: 2.08 M/UL — LOW (ref 4.2–5.8)
RBC # BLD: 2.42 M/UL — LOW (ref 4.2–5.8)
RBC # BLD: 2.42 M/UL — LOW (ref 4.2–5.8)
RBC # BLD: 2.52 M/UL — LOW (ref 4.2–5.8)
RBC # BLD: 2.52 M/UL — LOW (ref 4.2–5.8)
RBC # BLD: 2.66 M/UL — LOW (ref 4.2–5.8)
RBC # BLD: 2.66 M/UL — LOW (ref 4.2–5.8)
RBC # BLD: 2.97 M/UL — LOW (ref 4.2–5.8)
RBC # BLD: 2.97 M/UL — LOW (ref 4.2–5.8)
RBC # BLD: 2.99 M/UL — LOW (ref 4.2–5.8)
RBC # BLD: 2.99 M/UL — LOW (ref 4.2–5.8)
RBC # BLD: 3.51 M/UL — LOW (ref 4.2–5.8)
RBC # BLD: 3.63 M/UL — LOW (ref 4.2–5.8)
RBC # FLD: 13.6 % — SIGNIFICANT CHANGE UP (ref 10.3–14.5)
RBC # FLD: 13.6 % — SIGNIFICANT CHANGE UP (ref 10.3–14.5)
RBC # FLD: 14 % — SIGNIFICANT CHANGE UP (ref 10.3–14.5)
RBC # FLD: 14 % — SIGNIFICANT CHANGE UP (ref 10.3–14.5)
RBC # FLD: 14.6 % — HIGH (ref 10.3–14.5)
RBC # FLD: 14.8 % — HIGH (ref 10.3–14.5)
RBC # FLD: 14.8 % — HIGH (ref 10.3–14.5)
RBC # FLD: 15.8 % — HIGH (ref 10.3–14.5)
RBC # FLD: 15.8 % — HIGH (ref 10.3–14.5)
RBC # FLD: 15.9 % — HIGH (ref 10.3–14.5)
RBC # FLD: 15.9 % — HIGH (ref 10.3–14.5)
RBC BLD AUTO: NORMAL — SIGNIFICANT CHANGE UP
RBC BLD AUTO: NORMAL — SIGNIFICANT CHANGE UP
RBC BLD AUTO: SIGNIFICANT CHANGE UP
RBC BLD AUTO: SIGNIFICANT CHANGE UP
RH IG SCN BLD-IMP: POSITIVE — SIGNIFICANT CHANGE UP
SODIUM SERPL-SCNC: 132 MMOL/L
SODIUM SERPL-SCNC: 132 MMOL/L — LOW (ref 135–145)
SODIUM SERPL-SCNC: 132 MMOL/L — LOW (ref 135–145)
SODIUM SERPL-SCNC: 136 MMOL/L
SODIUM SERPL-SCNC: 136 MMOL/L — SIGNIFICANT CHANGE UP (ref 135–145)
SODIUM SERPL-SCNC: 137 MMOL/L — SIGNIFICANT CHANGE UP (ref 135–145)
SURGICAL PATHOLOGY STUDY: SIGNIFICANT CHANGE UP
T4 FREE SERPL-MCNC: 1.1 NG/DL — SIGNIFICANT CHANGE UP (ref 0.9–1.8)
T4 FREE+ TSH PNL SERPL: 0.82 UIU/ML — SIGNIFICANT CHANGE UP (ref 0.27–4.2)
T4 FREE+ TSH PNL SERPL: 0.82 UIU/ML — SIGNIFICANT CHANGE UP (ref 0.27–4.2)
T4 FREE+ TSH PNL SERPL: 2.23 UIU/ML — SIGNIFICANT CHANGE UP (ref 0.27–4.2)
T4 FREE+ TSH PNL SERPL: 2.23 UIU/ML — SIGNIFICANT CHANGE UP (ref 0.27–4.2)
T4 FREE+ TSH PNL SERPL: 5.59 UIU/ML — HIGH (ref 0.27–4.2)
TSH SERPL-ACNC: 2.39 UIU/ML
URATE SERPL-MCNC: 6.6 MG/DL
URATE SERPL-MCNC: 7 MG/DL
URATE SERPL-MCNC: 7.1 MG/DL — SIGNIFICANT CHANGE UP (ref 3.4–8.8)
URATE SERPL-MCNC: 7.3 MG/DL — SIGNIFICANT CHANGE UP (ref 3.4–8.8)
URATE SERPL-MCNC: 7.3 MG/DL — SIGNIFICANT CHANGE UP (ref 3.4–8.8)
URATE SERPL-MCNC: 7.4 MG/DL — SIGNIFICANT CHANGE UP (ref 3.4–8.8)
URATE SERPL-MCNC: 7.4 MG/DL — SIGNIFICANT CHANGE UP (ref 3.4–8.8)
WBC # BLD: 1.14 K/UL — LOW (ref 3.8–10.5)
WBC # BLD: 1.14 K/UL — LOW (ref 3.8–10.5)
WBC # BLD: 11.34 K/UL — HIGH (ref 3.8–10.5)
WBC # BLD: 11.34 K/UL — HIGH (ref 3.8–10.5)
WBC # BLD: 16.11 K/UL — HIGH (ref 3.8–10.5)
WBC # BLD: 16.11 K/UL — HIGH (ref 3.8–10.5)
WBC # BLD: 2.16 K/UL — LOW (ref 3.8–10.5)
WBC # BLD: 2.16 K/UL — LOW (ref 3.8–10.5)
WBC # BLD: 21.08 K/UL — HIGH (ref 3.8–10.5)
WBC # BLD: 21.08 K/UL — HIGH (ref 3.8–10.5)
WBC # BLD: 47.57 K/UL — CRITICAL HIGH (ref 3.8–10.5)
WBC # BLD: 47.57 K/UL — CRITICAL HIGH (ref 3.8–10.5)
WBC # BLD: 8.31 K/UL — SIGNIFICANT CHANGE UP (ref 3.8–10.5)
WBC # BLD: 8.6 K/UL — SIGNIFICANT CHANGE UP (ref 3.8–10.5)
WBC # FLD AUTO: 1.14 K/UL — LOW (ref 3.8–10.5)
WBC # FLD AUTO: 1.14 K/UL — LOW (ref 3.8–10.5)
WBC # FLD AUTO: 11.34 K/UL — HIGH (ref 3.8–10.5)
WBC # FLD AUTO: 11.34 K/UL — HIGH (ref 3.8–10.5)
WBC # FLD AUTO: 16.11 K/UL — HIGH (ref 3.8–10.5)
WBC # FLD AUTO: 16.11 K/UL — HIGH (ref 3.8–10.5)
WBC # FLD AUTO: 2.16 K/UL — LOW (ref 3.8–10.5)
WBC # FLD AUTO: 2.16 K/UL — LOW (ref 3.8–10.5)
WBC # FLD AUTO: 21.08 K/UL — HIGH (ref 3.8–10.5)
WBC # FLD AUTO: 21.08 K/UL — HIGH (ref 3.8–10.5)
WBC # FLD AUTO: 47.57 K/UL — CRITICAL HIGH (ref 3.8–10.5)
WBC # FLD AUTO: 47.57 K/UL — CRITICAL HIGH (ref 3.8–10.5)
WBC # FLD AUTO: 8.31 K/UL — SIGNIFICANT CHANGE UP (ref 3.8–10.5)
WBC # FLD AUTO: 8.6 K/UL — SIGNIFICANT CHANGE UP (ref 3.8–10.5)

## 2023-01-01 PROCEDURE — 88173 CYTOPATH EVAL FNA REPORT: CPT | Mod: 26

## 2023-01-01 PROCEDURE — 99214 OFFICE O/P EST MOD 30 MIN: CPT

## 2023-01-01 PROCEDURE — 71250 CT THORAX DX C-: CPT

## 2023-01-01 PROCEDURE — 76942 ECHO GUIDE FOR BIOPSY: CPT

## 2023-01-01 PROCEDURE — 88239 TISSUE CULTURE TUMOR: CPT

## 2023-01-01 PROCEDURE — 31575 DIAGNOSTIC LARYNGOSCOPY: CPT

## 2023-01-01 PROCEDURE — 93000 ELECTROCARDIOGRAM COMPLETE: CPT

## 2023-01-01 PROCEDURE — 70553 MRI BRAIN STEM W/O & W/DYE: CPT

## 2023-01-01 PROCEDURE — 99205 OFFICE O/P NEW HI 60 MIN: CPT

## 2023-01-01 PROCEDURE — 92526 ORAL FUNCTION THERAPY: CPT | Mod: GN

## 2023-01-01 PROCEDURE — 93010 ELECTROCARDIOGRAM REPORT: CPT

## 2023-01-01 PROCEDURE — 20220 BONE BIOPSY TROCAR/NDL SUPFC: CPT

## 2023-01-01 PROCEDURE — 88341 IMHCHEM/IMCYTCHM EA ADD ANTB: CPT

## 2023-01-01 PROCEDURE — 71250 CT THORAX DX C-: CPT | Mod: 26,MH

## 2023-01-01 PROCEDURE — 78815 PET IMAGE W/CT SKULL-THIGH: CPT

## 2023-01-01 PROCEDURE — A9585: CPT

## 2023-01-01 PROCEDURE — 86901 BLOOD TYPING SEROLOGIC RH(D): CPT

## 2023-01-01 PROCEDURE — A9552: CPT

## 2023-01-01 PROCEDURE — P9040: CPT

## 2023-01-01 PROCEDURE — 99215 OFFICE O/P EST HI 40 MIN: CPT

## 2023-01-01 PROCEDURE — 70553 MRI BRAIN STEM W/O & W/DYE: CPT | Mod: 26,MH

## 2023-01-01 PROCEDURE — 88342 IMHCHEM/IMCYTCHM 1ST ANTB: CPT

## 2023-01-01 PROCEDURE — 88305 TISSUE EXAM BY PATHOLOGIST: CPT | Mod: 26

## 2023-01-01 PROCEDURE — 88271 CYTOGENETICS DNA PROBE: CPT

## 2023-01-01 PROCEDURE — 88307 TISSUE EXAM BY PATHOLOGIST: CPT

## 2023-01-01 PROCEDURE — 86923 COMPATIBILITY TEST ELECTRIC: CPT

## 2023-01-01 PROCEDURE — 78815 PET IMAGE W/CT SKULL-THIGH: CPT | Mod: 26,PS,MH

## 2023-01-01 PROCEDURE — 88173 CYTOPATH EVAL FNA REPORT: CPT

## 2023-01-01 PROCEDURE — 99204 OFFICE O/P NEW MOD 45 MIN: CPT | Mod: 25

## 2023-01-01 PROCEDURE — 78815 PET IMAGE W/CT SKULL-THIGH: CPT | Mod: 26,PI,MH

## 2023-01-01 PROCEDURE — 20206 BIOPSY MUSCLE PERQ NEEDLE: CPT

## 2023-01-01 PROCEDURE — 86900 BLOOD TYPING SEROLOGIC ABO: CPT

## 2023-01-01 PROCEDURE — 93000 ELECTROCARDIOGRAM COMPLETE: CPT | Mod: NC

## 2023-01-01 PROCEDURE — 88273 CYTOGENETICS 10-30: CPT

## 2023-01-01 PROCEDURE — 88360 TUMOR IMMUNOHISTOCHEM/MANUAL: CPT

## 2023-01-01 PROCEDURE — 86850 RBC ANTIBODY SCREEN: CPT

## 2023-01-01 PROCEDURE — 88342 IMHCHEM/IMCYTCHM 1ST ANTB: CPT | Mod: 26,XP

## 2023-01-01 PROCEDURE — 42400 BIOPSY OF SALIVARY GLAND: CPT

## 2023-01-01 PROCEDURE — 94729 DIFFUSING CAPACITY: CPT

## 2023-01-01 PROCEDURE — 88341 IMHCHEM/IMCYTCHM EA ADD ANTB: CPT | Mod: 26,59

## 2023-01-01 PROCEDURE — 94060 EVALUATION OF WHEEZING: CPT

## 2023-01-01 PROCEDURE — 88360 TUMOR IMMUNOHISTOCHEM/MANUAL: CPT | Mod: 26

## 2023-01-01 PROCEDURE — 88305 TISSUE EXAM BY PATHOLOGIST: CPT

## 2023-01-01 PROCEDURE — 76942 ECHO GUIDE FOR BIOPSY: CPT | Mod: 26

## 2023-01-01 PROCEDURE — 94726 PLETHYSMOGRAPHY LUNG VOLUMES: CPT

## 2023-01-01 PROCEDURE — 88307 TISSUE EXAM BY PATHOLOGIST: CPT | Mod: 26

## 2023-01-01 PROCEDURE — 88341 IMHCHEM/IMCYTCHM EA ADD ANTB: CPT | Mod: 26,XP

## 2023-01-01 PROCEDURE — 88342 IMHCHEM/IMCYTCHM 1ST ANTB: CPT | Mod: 26,59

## 2023-01-01 RX ORDER — FAMOTIDINE 20 MG/1
20 TABLET, FILM COATED ORAL
Refills: 0 | Status: ACTIVE | COMMUNITY

## 2023-01-01 RX ORDER — MORPHINE SULFATE 10 MG
10 TABLET, HYPODERMIC INJECTION
Refills: 0 | Status: ACTIVE | COMMUNITY

## 2023-01-01 RX ORDER — SODIUM ZIRCONIUM CYCLOSILICATE 5 G/5G
5 POWDER, FOR SUSPENSION ORAL DAILY
Qty: 3 | Refills: 0 | Status: ACTIVE | COMMUNITY
Start: 2023-01-01 | End: 1900-01-01

## 2023-01-01 RX ORDER — MONTELUKAST 10 MG/1
10 TABLET, FILM COATED ORAL
Refills: 0 | Status: ACTIVE | COMMUNITY

## 2023-01-01 RX ORDER — AZELASTINE 137 UG/1
2 SPRAY, METERED NASAL
Refills: 0 | DISCHARGE

## 2023-01-01 RX ORDER — NICOTINE 21 MG/24HR
21 PATCH, TRANSDERMAL 24 HOURS TRANSDERMAL DAILY
Qty: 30 | Refills: 0 | Status: ACTIVE | COMMUNITY
Start: 2023-01-01 | End: 1900-01-01

## 2023-01-01 RX ORDER — DIPHENHYDRAMINE HYDROCHLORIDE AND LIDOCAINE HYDROCHLORIDE AND ALUMINUM HYDROXIDE AND MAGNESIUM HYDRO
KIT
Qty: 1 | Refills: 3 | Status: ACTIVE | COMMUNITY
Start: 2023-01-01 | End: 1900-01-01

## 2023-01-01 RX ORDER — MORPHINE SULFATE 50 MG/1
1 CAPSULE, EXTENDED RELEASE ORAL
Refills: 0 | DISCHARGE

## 2023-01-01 RX ORDER — FLUTICASONE PROPIONATE AND SALMETEROL 50; 250 UG/1; UG/1
1 POWDER ORAL; RESPIRATORY (INHALATION)
Refills: 0 | DISCHARGE

## 2023-01-01 RX ORDER — FLUTICASONE PROPIONATE AND SALMETEROL 50; 250 UG/1; UG/1
250-50 POWDER RESPIRATORY (INHALATION)
Refills: 0 | Status: ACTIVE | COMMUNITY

## 2023-01-01 RX ORDER — SIMVASTATIN 80 MG/1
TABLET, FILM COATED ORAL
Refills: 0 | Status: ACTIVE | COMMUNITY

## 2023-09-25 PROBLEM — E78.5 HYPERLIPIDEMIA: Status: RESOLVED | Noted: 2023-01-01 | Resolved: 2023-01-01

## 2023-09-25 PROBLEM — Z80.3 FAMILY HISTORY OF MALIGNANT NEOPLASM OF BREAST: Status: ACTIVE | Noted: 2023-01-01

## 2023-09-26 PROBLEM — F17.200 SMOKING: Status: ACTIVE | Noted: 2022-04-18

## 2023-10-23 PROBLEM — Z00.00 ENCOUNTER FOR PREVENTIVE HEALTH EXAMINATION: Status: ACTIVE | Noted: 2022-04-15

## 2023-10-23 PROBLEM — R94.31 ABNORMAL EKG: Status: ACTIVE | Noted: 2022-04-18

## 2023-10-23 NOTE — H&P PST ADULT - HISTORY OF PRESENT ILLNESS
72y/o male presents for preop eval for scheduled flexible bronchoscopy, left video assisted thoracoscopic surgery, lung resection with peristrips and IR localization  72y/o male presents for preop eval for scheduled flexible bronchoscopy, left video assisted thoracoscopic surgery, lung resection with peristrips and IR localization.   Pt states dx with  74y/o male presents for preop eval for scheduled flexible bronchoscopy, left video assisted thoracoscopic surgery, lung resection with peristrips and IR localization.   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  States self detected right neck mass to right side of neck several months ago.  Evaluated and biopsy positive.  Dx with cancer of parotid gland and imaging show left lung nodule.  As per pt completed to 2 cycles of chemotherapy.

## 2023-10-23 NOTE — PROVIDER CONTACT NOTE (CRITICAL VALUE NOTIFICATION) - BACKGROUND
74 yo male presents for preop evaluation for scheduled flexible bronchoscopy, left VATs lung resection tentatively on 10/27/2023. Patient dx with cancer of parotid gland, completed 2 cycles of chemotherapy
74 yo male with hx of COPD, GERD, dyslipidemia, TIMI, pulmonary nodule presents to have PST for flexible bronchoscopy, left VAT, lung resection with peristrips and IR localization

## 2023-10-23 NOTE — H&P PST ADULT - BSA (M2)
The options for medical versus surgical treatment as well as the risks, benefits and alternatives for each were reviewed with the patient. The patient understands and desires to proceed with incision and drainage. 1.88

## 2023-10-23 NOTE — H&P PST ADULT - PROBLEM SELECTOR PLAN 1
Scheduled flexible bronchoscopy, left video assisted thoracoscopic surgery, lung resection with peristrips and IR localization  Written & verbal preop instructions, gi prophylaxis- pt to take own  & surgical soap given  Pt verbalized good understanding.  Teach back done on surgical soap instructions.  cardiology eval scheduled on 10/24/23  Pending copy of report Scheduled flexible bronchoscopy, left video assisted thoracoscopic surgery, lung resection with peristrips and IR localization  Written & verbal preop instructions, gi prophylaxis- pt to take own  & surgical soap given  Pt verbalized good understanding.  Teach back done on surgical soap instructions.  cardiology eval scheduled on 10/24/23 and any available echo report   Pending copy of report

## 2023-10-23 NOTE — H&P PST ADULT - NSICDXPASTMEDICALHX_GEN_ALL_CORE_FT
PAST MEDICAL HISTORY:  Chronic back pain     COPD (chronic obstructive pulmonary disease)     Dyslipidemia     GERD (gastroesophageal reflux disease)     H/O basal cell carcinoma excision     Solitary pulmonary nodule      PAST MEDICAL HISTORY:  Chronic back pain     COPD (chronic obstructive pulmonary disease)     Dyslipidemia     GERD (gastroesophageal reflux disease)     H/O basal cell carcinoma excision     Obstructive sleep apnea     Solitary pulmonary nodule

## 2023-10-23 NOTE — PROVIDER CONTACT NOTE (CRITICAL VALUE NOTIFICATION) - ACTION/TREATMENT ORDERED:
Dr. Burch (covering [facility]) was notified. No further evaluation is recommended at this time other than notify the surgeon. Notify Dr. Bedolla via email.
Pt denies S&S of hypoglycemia. Pt to return to PST on 10/24/2023 for repeat glucose.

## 2023-10-24 PROBLEM — K21.9 GASTRO-ESOPHAGEAL REFLUX DISEASE WITHOUT ESOPHAGITIS: Chronic | Status: ACTIVE | Noted: 2023-01-01

## 2023-10-24 PROBLEM — M54.9 DORSALGIA, UNSPECIFIED: Chronic | Status: ACTIVE | Noted: 2023-01-01

## 2023-10-24 PROBLEM — R91.1 SOLITARY PULMONARY NODULE: Chronic | Status: ACTIVE | Noted: 2023-01-01

## 2023-10-24 PROBLEM — Z98.890 OTHER SPECIFIED POSTPROCEDURAL STATES: Chronic | Status: ACTIVE | Noted: 2023-01-01

## 2023-10-24 PROBLEM — E78.5 HYPERLIPIDEMIA, UNSPECIFIED: Chronic | Status: ACTIVE | Noted: 2023-01-01

## 2023-10-24 PROBLEM — J44.9 CHRONIC OBSTRUCTIVE PULMONARY DISEASE, UNSPECIFIED: Chronic | Status: ACTIVE | Noted: 2023-01-01

## 2023-10-25 PROBLEM — R22.1 NECK MASS: Status: ACTIVE | Noted: 2023-01-01

## 2023-10-26 PROBLEM — G47.33 OBSTRUCTIVE SLEEP APNEA (ADULT) (PEDIATRIC): Chronic | Status: ACTIVE | Noted: 2023-01-01

## 2023-11-24 PROBLEM — R91.1 PULMONARY NODULE, LEFT: Status: ACTIVE | Noted: 2023-01-01

## 2023-12-11 NOTE — H&P PST ADULT - OTHER CARE PROVIDERS
Dr Lokesh Hardin (cardiologist) [phone], Dr Keenan Jacobs (pain management) [phone] Dr Lokesh Hardin (cardiologist) [phone], Dr Keenan Jacobs (pain management) [phone]

## 2023-12-11 NOTE — H&P PST ADULT - PROBLEM SELECTOR PLAN 1
Patient tentatively scheduled for flexible bronchoscopy, left video assisted thoracoscopic surgery, lung resection with peristrips for 12/15/23. Pre-op instructions provided. Pt given verbal and written instructions with teach back on chlorhexidine shampoo. Pt will take own famotidine on the morning of procedure for GI prophylaxis. Pt verbalized understanding with return demonstration.     CBC CMP T&S done.    Copy of cardiac evaluation and ekg in chart.

## 2023-12-11 NOTE — H&P PST ADULT - HISTORY OF PRESENT ILLNESS
72 y/o male PMH current smoker (~0.5 ppd x +50 yrs), w/ hx of asthma, HLD, BCC (right shoulder), chronic back pain (on morphine) and GERD. 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 Pt is scheduled for a flexible bronchoscopy, left video assisted thoracoscopic surgery, lung resection with peristrips. Pt completed chemo (last session on 12/1/23). Procedure was rescheduled from 10/2023 due to decision to wait until next scan to re-evaluate.

## 2023-12-11 NOTE — H&P PST ADULT - NSICDXPASTMEDICALHX_GEN_ALL_CORE_FT
PAST MEDICAL HISTORY:  Anemia     Cancer of parotid gland     Chronic back pain     COPD (chronic obstructive pulmonary disease)     Dyslipidemia     GERD (gastroesophageal reflux disease)     H/O basal cell carcinoma excision     Obstructive sleep apnea     Small cell carcinoma     Solitary pulmonary nodule

## 2023-12-11 NOTE — H&P PST ADULT - NEGATIVE OPHTHALMOLOGIC SYMPTOMS
glasses/no diplopia/no photophobia/no blurred vision L/no blurred vision R/no loss of vision L/no loss of vision R

## 2023-12-11 NOTE — H&P PST ADULT - PATIENT'S GENDER IDENTITY
From: Ishaan Maurer  To: Eli Ceballos MD  Sent: 4/2/2020 9:53 AM CDT  Subject: Other    Now that the isolation period has been extended beyond 2 weeks do I need another letter or if I'm still covered?  please advise Thank you Male

## 2023-12-11 NOTE — H&P PST ADULT - PROBLEM SELECTOR PLAN 4
Patient instructed to take morphine with a sip of water on the morning of procedure.     Email sent to pain management.

## 2023-12-11 NOTE — PROVIDER CONTACT NOTE (CRITICAL VALUE NOTIFICATION) - BACKGROUND
73 yr old male was seen for PST eval and is scheduled for flexible bronchoscopy, left video assisted thoracoscopic surgery, lung resection on 12/15/23

## 2023-12-12 PROBLEM — C80.1 MALIGNANT (PRIMARY) NEOPLASM, UNSPECIFIED: Chronic | Status: ACTIVE | Noted: 2023-01-01

## 2023-12-12 PROBLEM — D64.9 ANEMIA, UNSPECIFIED: Chronic | Status: ACTIVE | Noted: 2023-01-01

## 2023-12-12 PROBLEM — C07 MALIGNANT NEOPLASM OF PAROTID GLAND: Chronic | Status: ACTIVE | Noted: 2023-01-01

## 2023-12-30 NOTE — REVIEW OF SYSTEMS
[Dry Eyes] : no dryness of the eyes [Vision Problems] : no vision problems [Dysphagia] : no dysphagia [Loss of Hearing] : no loss of hearing [Odynophagia] : no odynophagia [Chest Pain] : no chest pain [Palpitations] : no palpitations [Wheezing] : no wheezing [Abdominal Pain] : no abdominal pain [Vomiting] : no vomiting [Constipation] : no constipation [Dysuria] : no dysuria [Incontinence] : no incontinence [Joint Pain] : no joint pain [Joint Stiffness] : no joint stiffness [Confused] : no confusion [Dizziness] : no dizziness [Insomnia] : no insomnia

## 2023-12-30 NOTE — PHYSICAL EXAM
[de-identified] : marked decrease in size of parotid mass.  [de-identified] : Barrel chest, diffusely diminished breath sounds, bibasilar crackles and wheezing

## 2023-12-30 NOTE — ASSESSMENT
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 Imaging with metastatic ipsilateraly lymphadenopathy and possible contralateral parmjit metastases concerning for extensive stage small cell ca. we reviewed the imaging independently. A primary has not been detected. Of note, there is an additional hypermetabolic focus corresponding to a small area of protuberant soft tissue at the LEFT anteromedial scalp vertex. Interestingly, scalp lesion bx c/w atypical spindle and pleomorphic proliferation involving fibroadipose tissue- perhaps suggesting a low grade sarcoma. This does not seem to be connected with the cervical parmjit mass So far, no primary source for small cell has been detected, but could be lung, given extensive smoking hx, and emphysematous lung and possible lung nodules.  Baseline MRI done on 10/28 shows no mets He started induction intent with carboplatin and etoposide and this started 9/28/2023 along with Neulasta Has been tolerating well and has had a great response He saw Dr Bedolla for lung bx but it was decided to wait for the procedure till after next scan since the lesions may have responded to tx Repeat CT scans for 11/21-> Increased right lower lobe distal mucoid impacted airways and nodular opacities at the right lung base and new airspace consolidation left lower lobe. New indeterminate 0.9 x 0.7 cm medial left apical nodule. Additional stable sub 0.6 cm upper lobe nodules, some of which are calcified. Radiologist recommended repeat chest CT in one month, after the appropriate clinical treatment, to reevaluate, particularly the left apical nodule. This order was placed and scan is now scheduled for 12/19.  Reviewed labs: Ok to proceed with tx. However, will need transfusion since Hgb 7.9.  Severe fatigue: Multifactorial, but mostly related to chemo.  Smoking cessation re-iterated. Referred to smoking cessation center.   His uptrending white count along with new enlarging opacity could be due to PNA-> hence, he was prescribed antibiotic course which seems to have helped However, on today's labs, pt is pancytopenic with Hgb at 7.9 and plt at 12. Chemo will not be administered today.  Discussed with pt that going to hospital would be appropriate, but pt wants to wait since feels Ok We discussed IV hydration support.  Cr has gone up to 2.7, presumably pre-renal due to lack of hydration Referred to nephrology  Oral care discussed. Reschedule chemo Follow up with rad onc, ENT and thoracic surgery. We have requested calcitonin and polyoma testing on tissue to r/o MTC and Merkel cell ca respectively.

## 2024-01-01 ENCOUNTER — RESULT REVIEW (OUTPATIENT)
Age: 74
End: 2024-01-01

## 2024-01-01 ENCOUNTER — APPOINTMENT (OUTPATIENT)
Dept: HEMATOLOGY ONCOLOGY | Facility: CLINIC | Age: 74
End: 2024-01-01
Payer: MEDICARE

## 2024-01-01 ENCOUNTER — APPOINTMENT (OUTPATIENT)
Dept: CT IMAGING | Facility: IMAGING CENTER | Age: 74
End: 2024-01-01
Payer: MEDICARE

## 2024-01-01 ENCOUNTER — APPOINTMENT (OUTPATIENT)
Dept: INFUSION THERAPY | Facility: HOSPITAL | Age: 74
End: 2024-01-01

## 2024-01-01 ENCOUNTER — APPOINTMENT (OUTPATIENT)
Dept: HEMATOLOGY ONCOLOGY | Facility: CLINIC | Age: 74
End: 2024-01-01

## 2024-01-01 ENCOUNTER — NON-APPOINTMENT (OUTPATIENT)
Age: 74
End: 2024-01-01

## 2024-01-01 ENCOUNTER — OUTPATIENT (OUTPATIENT)
Dept: OUTPATIENT SERVICES | Facility: HOSPITAL | Age: 74
LOS: 1 days | Discharge: HOME | End: 2024-01-01
Payer: MEDICARE

## 2024-01-01 ENCOUNTER — OUTPATIENT (OUTPATIENT)
Dept: OUTPATIENT SERVICES | Facility: HOSPITAL | Age: 74
LOS: 1 days | End: 2024-01-01
Payer: MEDICARE

## 2024-01-01 ENCOUNTER — APPOINTMENT (OUTPATIENT)
Dept: OTOLARYNGOLOGY | Facility: CLINIC | Age: 74
End: 2024-01-01

## 2024-01-01 ENCOUNTER — OUTPATIENT (OUTPATIENT)
Dept: OUTPATIENT SERVICES | Facility: HOSPITAL | Age: 74
LOS: 1 days | Discharge: HOME | End: 2024-01-01

## 2024-01-01 ENCOUNTER — APPOINTMENT (OUTPATIENT)
Dept: RADIATION ONCOLOGY | Facility: CLINIC | Age: 74
End: 2024-01-01

## 2024-01-01 ENCOUNTER — APPOINTMENT (OUTPATIENT)
Dept: OTOLARYNGOLOGY | Facility: CLINIC | Age: 74
End: 2024-01-01
Payer: MEDICARE

## 2024-01-01 ENCOUNTER — APPOINTMENT (OUTPATIENT)
Dept: RADIOLOGY | Facility: IMAGING CENTER | Age: 74
End: 2024-01-01
Payer: MEDICARE

## 2024-01-01 ENCOUNTER — OUTPATIENT (OUTPATIENT)
Dept: OUTPATIENT SERVICES | Facility: HOSPITAL | Age: 74
LOS: 1 days | End: 2024-01-01

## 2024-01-01 ENCOUNTER — APPOINTMENT (OUTPATIENT)
Dept: RADIATION ONCOLOGY | Facility: CLINIC | Age: 74
End: 2024-01-01
Payer: MEDICARE

## 2024-01-01 VITALS
RESPIRATION RATE: 16 BRPM | HEIGHT: 72 IN | OXYGEN SATURATION: 95 % | BODY MASS INDEX: 17.74 KG/M2 | HEART RATE: 93 BPM | SYSTOLIC BLOOD PRESSURE: 100 MMHG | WEIGHT: 131 LBS | TEMPERATURE: 95.18 F | DIASTOLIC BLOOD PRESSURE: 68 MMHG

## 2024-01-01 VITALS
HEIGHT: 72 IN | WEIGHT: 129.41 LBS | DIASTOLIC BLOOD PRESSURE: 69 MMHG | BODY MASS INDEX: 17.53 KG/M2 | OXYGEN SATURATION: 97 % | TEMPERATURE: 96.1 F | HEART RATE: 93 BPM | SYSTOLIC BLOOD PRESSURE: 109 MMHG | RESPIRATION RATE: 16 BRPM

## 2024-01-01 VITALS
WEIGHT: 134.37 LBS | DIASTOLIC BLOOD PRESSURE: 70 MMHG | HEIGHT: 72 IN | RESPIRATION RATE: 17 BRPM | SYSTOLIC BLOOD PRESSURE: 105 MMHG | HEART RATE: 102 BPM | TEMPERATURE: 96.7 F | OXYGEN SATURATION: 98 % | BODY MASS INDEX: 18.2 KG/M2

## 2024-01-01 VITALS
TEMPERATURE: 95.7 F | OXYGEN SATURATION: 97 % | HEART RATE: 109 BPM | BODY MASS INDEX: 17.74 KG/M2 | DIASTOLIC BLOOD PRESSURE: 79 MMHG | HEIGHT: 72 IN | SYSTOLIC BLOOD PRESSURE: 113 MMHG | RESPIRATION RATE: 17 BRPM | WEIGHT: 131 LBS

## 2024-01-01 VITALS
SYSTOLIC BLOOD PRESSURE: 112 MMHG | DIASTOLIC BLOOD PRESSURE: 70 MMHG | HEART RATE: 102 BPM | TEMPERATURE: 98.2 F | WEIGHT: 131.39 LBS | BODY MASS INDEX: 17.8 KG/M2 | HEIGHT: 72 IN | RESPIRATION RATE: 17 BRPM | OXYGEN SATURATION: 99 %

## 2024-01-01 VITALS
SYSTOLIC BLOOD PRESSURE: 100 MMHG | DIASTOLIC BLOOD PRESSURE: 68 MMHG | OXYGEN SATURATION: 96 % | HEIGHT: 72 IN | HEART RATE: 113 BPM | TEMPERATURE: 97.9 F | WEIGHT: 134.04 LBS | RESPIRATION RATE: 16 BRPM | BODY MASS INDEX: 18.16 KG/M2

## 2024-01-01 VITALS
SYSTOLIC BLOOD PRESSURE: 104 MMHG | OXYGEN SATURATION: 96 % | TEMPERATURE: 97.8 F | WEIGHT: 134.26 LBS | HEART RATE: 89 BPM | BODY MASS INDEX: 18.21 KG/M2 | RESPIRATION RATE: 17 BRPM | DIASTOLIC BLOOD PRESSURE: 64 MMHG

## 2024-01-01 VITALS
OXYGEN SATURATION: 94 % | DIASTOLIC BLOOD PRESSURE: 67 MMHG | BODY MASS INDEX: 17.36 KG/M2 | SYSTOLIC BLOOD PRESSURE: 94 MMHG | RESPIRATION RATE: 18 BRPM | WEIGHT: 128 LBS | HEART RATE: 107 BPM | TEMPERATURE: 98.6 F

## 2024-01-01 VITALS
BODY MASS INDEX: 17.69 KG/M2 | HEIGHT: 72 IN | RESPIRATION RATE: 17 BRPM | OXYGEN SATURATION: 100 % | TEMPERATURE: 98.78 F | SYSTOLIC BLOOD PRESSURE: 127 MMHG | DIASTOLIC BLOOD PRESSURE: 78 MMHG | WEIGHT: 130.62 LBS | HEART RATE: 72 BPM

## 2024-01-01 VITALS
DIASTOLIC BLOOD PRESSURE: 74 MMHG | TEMPERATURE: 98.78 F | HEIGHT: 72 IN | SYSTOLIC BLOOD PRESSURE: 112 MMHG | RESPIRATION RATE: 17 BRPM | OXYGEN SATURATION: 99 % | WEIGHT: 136.68 LBS | BODY MASS INDEX: 18.51 KG/M2 | HEART RATE: 97 BPM

## 2024-01-01 VITALS
WEIGHT: 134 LBS | HEART RATE: 115 BPM | OXYGEN SATURATION: 97 % | HEIGHT: 72 IN | DIASTOLIC BLOOD PRESSURE: 61 MMHG | SYSTOLIC BLOOD PRESSURE: 100 MMHG | BODY MASS INDEX: 18.15 KG/M2

## 2024-01-01 VITALS
SYSTOLIC BLOOD PRESSURE: 99 MMHG | BODY MASS INDEX: 17.54 KG/M2 | HEART RATE: 118 BPM | WEIGHT: 129.3 LBS | DIASTOLIC BLOOD PRESSURE: 58 MMHG | RESPIRATION RATE: 16 BRPM | OXYGEN SATURATION: 96 % | TEMPERATURE: 94.5 F

## 2024-01-01 DIAGNOSIS — Z90.49 ACQUIRED ABSENCE OF OTHER SPECIFIED PARTS OF DIGESTIVE TRACT: Chronic | ICD-10-CM

## 2024-01-01 DIAGNOSIS — C07 MALIGNANT NEOPLASM OF PAROTID GLAND: ICD-10-CM

## 2024-01-01 DIAGNOSIS — Z98.890 OTHER SPECIFIED POSTPROCEDURAL STATES: Chronic | ICD-10-CM

## 2024-01-01 DIAGNOSIS — C80.1 MALIGNANT (PRIMARY) NEOPLASM, UNSPECIFIED: ICD-10-CM

## 2024-01-01 DIAGNOSIS — R11.2 NAUSEA WITH VOMITING, UNSPECIFIED: ICD-10-CM

## 2024-01-01 DIAGNOSIS — C34.90 MALIGNANT NEOPLASM OF UNSPECIFIED PART OF UNSPECIFIED BRONCHUS OR LUNG: ICD-10-CM

## 2024-01-01 DIAGNOSIS — J18.9 PNEUMONIA, UNSPECIFIED ORGANISM: ICD-10-CM

## 2024-01-01 DIAGNOSIS — E86.0 DEHYDRATION: ICD-10-CM

## 2024-01-01 DIAGNOSIS — Z51.11 ENCOUNTER FOR ANTINEOPLASTIC CHEMOTHERAPY: ICD-10-CM

## 2024-01-01 LAB
ALBUMIN SERPL ELPH-MCNC: 3.1 G/DL
ALBUMIN SERPL ELPH-MCNC: 3.2 G/DL — LOW (ref 3.3–5)
ALBUMIN SERPL ELPH-MCNC: 3.3 G/DL
ALBUMIN SERPL ELPH-MCNC: 3.3 G/DL — SIGNIFICANT CHANGE UP (ref 3.3–5)
ALBUMIN SERPL ELPH-MCNC: 3.4 G/DL — SIGNIFICANT CHANGE UP (ref 3.3–5)
ALP BLD-CCNC: 108 U/L
ALP BLD-CCNC: 117 U/L
ALP SERPL-CCNC: 106 U/L — SIGNIFICANT CHANGE UP (ref 40–120)
ALP SERPL-CCNC: 125 U/L — HIGH (ref 40–120)
ALP SERPL-CCNC: 184 U/L — HIGH (ref 40–120)
ALP SERPL-CCNC: 336 U/L — HIGH (ref 40–120)
ALP SERPL-CCNC: 88 U/L — SIGNIFICANT CHANGE UP (ref 40–120)
ALP SERPL-CCNC: 89 U/L — SIGNIFICANT CHANGE UP (ref 40–120)
ALP SERPL-CCNC: 93 U/L — SIGNIFICANT CHANGE UP (ref 40–120)
ALT FLD-CCNC: 11 U/L — SIGNIFICANT CHANGE UP (ref 10–45)
ALT FLD-CCNC: 14 U/L — SIGNIFICANT CHANGE UP (ref 10–45)
ALT FLD-CCNC: 47 U/L — HIGH (ref 10–45)
ALT FLD-CCNC: 5 U/L — LOW (ref 10–45)
ALT FLD-CCNC: 7 U/L — LOW (ref 10–45)
ALT FLD-CCNC: 8 U/L — LOW (ref 10–45)
ALT FLD-CCNC: <5 U/L — LOW (ref 10–45)
ALT SERPL-CCNC: 15 U/L
ALT SERPL-CCNC: 25 U/L
ANION GAP SERPL CALC-SCNC: 10 MMOL/L — SIGNIFICANT CHANGE UP (ref 5–17)
ANION GAP SERPL CALC-SCNC: 12 MMOL/L
ANION GAP SERPL CALC-SCNC: 12 MMOL/L — SIGNIFICANT CHANGE UP (ref 5–17)
ANION GAP SERPL CALC-SCNC: 15 MMOL/L
ANION GAP SERPL CALC-SCNC: 9 MMOL/L — SIGNIFICANT CHANGE UP (ref 5–17)
ANISOCYTOSIS BLD QL: SLIGHT — SIGNIFICANT CHANGE UP
AST SERPL-CCNC: 15 U/L — SIGNIFICANT CHANGE UP (ref 10–40)
AST SERPL-CCNC: 15 U/L — SIGNIFICANT CHANGE UP (ref 10–40)
AST SERPL-CCNC: 17 U/L — SIGNIFICANT CHANGE UP (ref 10–40)
AST SERPL-CCNC: 17 U/L — SIGNIFICANT CHANGE UP (ref 10–40)
AST SERPL-CCNC: 21 U/L
AST SERPL-CCNC: 26 U/L
AST SERPL-CCNC: 26 U/L — SIGNIFICANT CHANGE UP (ref 10–40)
AST SERPL-CCNC: 29 U/L — SIGNIFICANT CHANGE UP (ref 10–40)
AST SERPL-CCNC: 30 U/L — SIGNIFICANT CHANGE UP (ref 10–40)
BASOPHILS # BLD AUTO: 0 K/UL — SIGNIFICANT CHANGE UP (ref 0–0.2)
BASOPHILS # BLD AUTO: 0.03 K/UL — SIGNIFICANT CHANGE UP (ref 0–0.2)
BASOPHILS # BLD AUTO: 0.03 K/UL — SIGNIFICANT CHANGE UP (ref 0–0.2)
BASOPHILS # BLD AUTO: 0.04 K/UL — SIGNIFICANT CHANGE UP (ref 0–0.2)
BASOPHILS # BLD AUTO: 0.05 K/UL — SIGNIFICANT CHANGE UP (ref 0–0.2)
BASOPHILS # BLD AUTO: 0.06 K/UL — SIGNIFICANT CHANGE UP (ref 0–0.2)
BASOPHILS # BLD AUTO: 0.07 K/UL — SIGNIFICANT CHANGE UP (ref 0–0.2)
BASOPHILS # BLD AUTO: 0.08 K/UL — SIGNIFICANT CHANGE UP (ref 0–0.2)
BASOPHILS NFR BLD AUTO: 0 % — SIGNIFICANT CHANGE UP (ref 0–2)
BASOPHILS NFR BLD AUTO: 0.4 % — SIGNIFICANT CHANGE UP (ref 0–2)
BASOPHILS NFR BLD AUTO: 0.4 % — SIGNIFICANT CHANGE UP (ref 0–2)
BASOPHILS NFR BLD AUTO: 0.6 % — SIGNIFICANT CHANGE UP (ref 0–2)
BASOPHILS NFR BLD AUTO: 1.2 % — SIGNIFICANT CHANGE UP (ref 0–2)
BILIRUB SERPL-MCNC: 0.2 MG/DL — SIGNIFICANT CHANGE UP (ref 0.2–1.2)
BILIRUB SERPL-MCNC: 0.2 MG/DL — SIGNIFICANT CHANGE UP (ref 0.2–1.2)
BILIRUB SERPL-MCNC: 0.3 MG/DL
BILIRUB SERPL-MCNC: 0.3 MG/DL
BILIRUB SERPL-MCNC: 0.3 MG/DL — SIGNIFICANT CHANGE UP (ref 0.2–1.2)
BUN SERPL-MCNC: 18 MG/DL — SIGNIFICANT CHANGE UP (ref 7–23)
BUN SERPL-MCNC: 21 MG/DL
BUN SERPL-MCNC: 21 MG/DL — SIGNIFICANT CHANGE UP (ref 7–23)
BUN SERPL-MCNC: 22 MG/DL — SIGNIFICANT CHANGE UP (ref 7–23)
BUN SERPL-MCNC: 23 MG/DL
BUN SERPL-MCNC: 24 MG/DL — HIGH (ref 7–23)
BUN SERPL-MCNC: 25 MG/DL — HIGH (ref 7–23)
BUN SERPL-MCNC: 32 MG/DL — HIGH (ref 7–23)
BUN SERPL-MCNC: 34 MG/DL — HIGH (ref 7–23)
CALCIUM SERPL-MCNC: 8.7 MG/DL
CALCIUM SERPL-MCNC: 8.9 MG/DL
CALCIUM SERPL-MCNC: 8.9 MG/DL — SIGNIFICANT CHANGE UP (ref 8.4–10.5)
CALCIUM SERPL-MCNC: 9 MG/DL — SIGNIFICANT CHANGE UP (ref 8.4–10.5)
CALCIUM SERPL-MCNC: 9.1 MG/DL — SIGNIFICANT CHANGE UP (ref 8.4–10.5)
CALCIUM SERPL-MCNC: 9.1 MG/DL — SIGNIFICANT CHANGE UP (ref 8.4–10.5)
CALCIUM SERPL-MCNC: 9.3 MG/DL — SIGNIFICANT CHANGE UP (ref 8.4–10.5)
CALCIUM SERPL-MCNC: 9.3 MG/DL — SIGNIFICANT CHANGE UP (ref 8.4–10.5)
CALCIUM SERPL-MCNC: 9.4 MG/DL — SIGNIFICANT CHANGE UP (ref 8.4–10.5)
CHLORIDE SERPL-SCNC: 100 MMOL/L
CHLORIDE SERPL-SCNC: 100 MMOL/L
CHLORIDE SERPL-SCNC: 100 MMOL/L — SIGNIFICANT CHANGE UP (ref 96–108)
CHLORIDE SERPL-SCNC: 101 MMOL/L — SIGNIFICANT CHANGE UP (ref 96–108)
CHLORIDE SERPL-SCNC: 102 MMOL/L — SIGNIFICANT CHANGE UP (ref 96–108)
CHLORIDE SERPL-SCNC: 104 MMOL/L — SIGNIFICANT CHANGE UP (ref 96–108)
CO2 SERPL-SCNC: 20 MMOL/L
CO2 SERPL-SCNC: 20 MMOL/L — LOW (ref 22–31)
CO2 SERPL-SCNC: 21 MMOL/L — LOW (ref 22–31)
CO2 SERPL-SCNC: 22 MMOL/L — SIGNIFICANT CHANGE UP (ref 22–31)
CO2 SERPL-SCNC: 23 MMOL/L
CO2 SERPL-SCNC: 23 MMOL/L — SIGNIFICANT CHANGE UP (ref 22–31)
CO2 SERPL-SCNC: 23 MMOL/L — SIGNIFICANT CHANGE UP (ref 22–31)
CO2 SERPL-SCNC: 24 MMOL/L — SIGNIFICANT CHANGE UP (ref 22–31)
CO2 SERPL-SCNC: 24 MMOL/L — SIGNIFICANT CHANGE UP (ref 22–31)
CREAT SERPL-MCNC: 0.97 MG/DL — SIGNIFICANT CHANGE UP (ref 0.5–1.3)
CREAT SERPL-MCNC: 1.07 MG/DL
CREAT SERPL-MCNC: 1.07 MG/DL — SIGNIFICANT CHANGE UP (ref 0.5–1.3)
CREAT SERPL-MCNC: 1.08 MG/DL — SIGNIFICANT CHANGE UP (ref 0.5–1.3)
CREAT SERPL-MCNC: 1.09 MG/DL
CREAT SERPL-MCNC: 1.1 MG/DL — SIGNIFICANT CHANGE UP (ref 0.5–1.3)
CREAT SERPL-MCNC: 1.14 MG/DL — SIGNIFICANT CHANGE UP (ref 0.5–1.3)
CREAT SERPL-MCNC: 1.2 MG/DL — SIGNIFICANT CHANGE UP (ref 0.5–1.3)
CREAT SERPL-MCNC: 1.3 MG/DL — SIGNIFICANT CHANGE UP (ref 0.5–1.3)
DACRYOCYTES BLD QL SMEAR: SLIGHT — SIGNIFICANT CHANGE UP
EGFR: 58 ML/MIN/1.73M2 — LOW
EGFR: 63 ML/MIN/1.73M2 — SIGNIFICANT CHANGE UP
EGFR: 67 ML/MIN/1.73M2 — SIGNIFICANT CHANGE UP
EGFR: 70 ML/MIN/1.73M2 — SIGNIFICANT CHANGE UP
EGFR: 72 ML/MIN/1.73M2
EGFR: 72 ML/MIN/1.73M2 — SIGNIFICANT CHANGE UP
EGFR: 73 ML/MIN/1.73M2
EGFR: 73 ML/MIN/1.73M2 — SIGNIFICANT CHANGE UP
EGFR: 82 ML/MIN/1.73M2 — SIGNIFICANT CHANGE UP
ELLIPTOCYTES BLD QL SMEAR: SLIGHT — SIGNIFICANT CHANGE UP
EOSINOPHIL # BLD AUTO: 0 K/UL — SIGNIFICANT CHANGE UP (ref 0–0.5)
EOSINOPHIL # BLD AUTO: 0.04 K/UL — SIGNIFICANT CHANGE UP (ref 0–0.5)
EOSINOPHIL # BLD AUTO: 0.05 K/UL — SIGNIFICANT CHANGE UP (ref 0–0.5)
EOSINOPHIL # BLD AUTO: 0.07 K/UL — SIGNIFICANT CHANGE UP (ref 0–0.5)
EOSINOPHIL # BLD AUTO: 0.07 K/UL — SIGNIFICANT CHANGE UP (ref 0–0.5)
EOSINOPHIL # BLD AUTO: 0.15 K/UL — SIGNIFICANT CHANGE UP (ref 0–0.5)
EOSINOPHIL # BLD AUTO: 0.21 K/UL — SIGNIFICANT CHANGE UP (ref 0–0.5)
EOSINOPHIL # BLD AUTO: 0.21 K/UL — SIGNIFICANT CHANGE UP (ref 0–0.5)
EOSINOPHIL # BLD AUTO: 0.39 K/UL — SIGNIFICANT CHANGE UP (ref 0–0.5)
EOSINOPHIL # BLD AUTO: 0.4 K/UL — SIGNIFICANT CHANGE UP (ref 0–0.5)
EOSINOPHIL NFR BLD AUTO: 0 % — SIGNIFICANT CHANGE UP (ref 0–6)
EOSINOPHIL NFR BLD AUTO: 0.7 % — SIGNIFICANT CHANGE UP (ref 0–6)
EOSINOPHIL NFR BLD AUTO: 0.8 % — SIGNIFICANT CHANGE UP (ref 0–6)
EOSINOPHIL NFR BLD AUTO: 1 % — SIGNIFICANT CHANGE UP (ref 0–6)
EOSINOPHIL NFR BLD AUTO: 1 % — SIGNIFICANT CHANGE UP (ref 0–6)
EOSINOPHIL NFR BLD AUTO: 1.1 % — SIGNIFICANT CHANGE UP (ref 0–6)
EOSINOPHIL NFR BLD AUTO: 1.3 % — SIGNIFICANT CHANGE UP (ref 0–6)
EOSINOPHIL NFR BLD AUTO: 1.3 % — SIGNIFICANT CHANGE UP (ref 0–6)
EOSINOPHIL NFR BLD AUTO: 3 % — SIGNIFICANT CHANGE UP (ref 0–6)
EOSINOPHIL NFR BLD AUTO: 3.3 % — SIGNIFICANT CHANGE UP (ref 0–6)
GLUCOSE SERPL-MCNC: 100 MG/DL — HIGH (ref 70–99)
GLUCOSE SERPL-MCNC: 103 MG/DL — HIGH (ref 70–99)
GLUCOSE SERPL-MCNC: 106 MG/DL — HIGH (ref 70–99)
GLUCOSE SERPL-MCNC: 112 MG/DL
GLUCOSE SERPL-MCNC: 112 MG/DL — HIGH (ref 70–99)
GLUCOSE SERPL-MCNC: 123 MG/DL — HIGH (ref 70–99)
GLUCOSE SERPL-MCNC: 71 MG/DL — SIGNIFICANT CHANGE UP (ref 70–99)
GLUCOSE SERPL-MCNC: 85 MG/DL
GLUCOSE SERPL-MCNC: 85 MG/DL — SIGNIFICANT CHANGE UP (ref 70–99)
HCT VFR BLD CALC: 24.9 % — LOW (ref 39–50)
HCT VFR BLD CALC: 26.1 % — LOW (ref 39–50)
HCT VFR BLD CALC: 27.4 % — LOW (ref 39–50)
HCT VFR BLD CALC: 27.7 % — LOW (ref 39–50)
HCT VFR BLD CALC: 27.7 % — LOW (ref 39–50)
HCT VFR BLD CALC: 27.9 % — LOW (ref 39–50)
HCT VFR BLD CALC: 28.2 % — LOW (ref 39–50)
HCT VFR BLD CALC: 32.6 % — LOW (ref 39–50)
HCT VFR BLD CALC: 33.1 % — LOW (ref 39–50)
HCT VFR BLD CALC: 33.1 % — LOW (ref 39–50)
HCT VFR BLD CALC: 38.4 % — LOW (ref 39–50)
HGB BLD-MCNC: 10.3 G/DL — LOW (ref 13–17)
HGB BLD-MCNC: 10.3 G/DL — LOW (ref 13–17)
HGB BLD-MCNC: 10.5 G/DL — LOW (ref 13–17)
HGB BLD-MCNC: 12.6 G/DL — LOW (ref 13–17)
HGB BLD-MCNC: 8.1 G/DL — LOW (ref 13–17)
HGB BLD-MCNC: 8.5 G/DL — LOW (ref 13–17)
HGB BLD-MCNC: 8.5 G/DL — LOW (ref 13–17)
HGB BLD-MCNC: 8.8 G/DL — LOW (ref 13–17)
HGB BLD-MCNC: 8.9 G/DL — LOW (ref 13–17)
HGB BLD-MCNC: 9 G/DL — LOW (ref 13–17)
HGB BLD-MCNC: 9.1 G/DL — LOW (ref 13–17)
IMM GRANULOCYTES NFR BLD AUTO: 0.5 % — SIGNIFICANT CHANGE UP (ref 0–0.9)
IMM GRANULOCYTES NFR BLD AUTO: 0.6 % — SIGNIFICANT CHANGE UP (ref 0–0.9)
IMM GRANULOCYTES NFR BLD AUTO: 0.6 % — SIGNIFICANT CHANGE UP (ref 0–0.9)
IMM GRANULOCYTES NFR BLD AUTO: 0.7 % — SIGNIFICANT CHANGE UP (ref 0–0.9)
IMM GRANULOCYTES NFR BLD AUTO: 0.7 % — SIGNIFICANT CHANGE UP (ref 0–0.9)
IMM GRANULOCYTES NFR BLD AUTO: 1 % — HIGH (ref 0–0.9)
IMM GRANULOCYTES NFR BLD AUTO: 3.6 % — HIGH (ref 0–0.9)
LDH SERPL L TO P-CCNC: 133 U/L — SIGNIFICANT CHANGE UP (ref 50–242)
LDH SERPL L TO P-CCNC: 154 U/L — SIGNIFICANT CHANGE UP (ref 50–242)
LDH SERPL L TO P-CCNC: 156 U/L — SIGNIFICANT CHANGE UP (ref 50–242)
LDH SERPL L TO P-CCNC: 157 U/L — SIGNIFICANT CHANGE UP (ref 50–242)
LDH SERPL L TO P-CCNC: 184 U/L — SIGNIFICANT CHANGE UP (ref 50–242)
LDH SERPL L TO P-CCNC: 279 U/L — HIGH (ref 50–242)
LDH SERPL L TO P-CCNC: 288 U/L — HIGH (ref 50–242)
LDH SERPL-CCNC: 257 U/L
LYMPHOCYTES # BLD AUTO: 1.08 K/UL — SIGNIFICANT CHANGE UP (ref 1–3.3)
LYMPHOCYTES # BLD AUTO: 1.29 K/UL — SIGNIFICANT CHANGE UP (ref 1–3.3)
LYMPHOCYTES # BLD AUTO: 1.29 K/UL — SIGNIFICANT CHANGE UP (ref 1–3.3)
LYMPHOCYTES # BLD AUTO: 1.46 K/UL — SIGNIFICANT CHANGE UP (ref 1–3.3)
LYMPHOCYTES # BLD AUTO: 1.51 K/UL — SIGNIFICANT CHANGE UP (ref 1–3.3)
LYMPHOCYTES # BLD AUTO: 10 % — LOW (ref 13–44)
LYMPHOCYTES # BLD AUTO: 12.4 % — LOW (ref 13–44)
LYMPHOCYTES # BLD AUTO: 16.7 % — SIGNIFICANT CHANGE UP (ref 13–44)
LYMPHOCYTES # BLD AUTO: 19 % — SIGNIFICANT CHANGE UP (ref 13–44)
LYMPHOCYTES # BLD AUTO: 2.77 K/UL — SIGNIFICANT CHANGE UP (ref 1–3.3)
LYMPHOCYTES # BLD AUTO: 2.96 K/UL — SIGNIFICANT CHANGE UP (ref 1–3.3)
LYMPHOCYTES # BLD AUTO: 22 % — SIGNIFICANT CHANGE UP (ref 13–44)
LYMPHOCYTES # BLD AUTO: 22 % — SIGNIFICANT CHANGE UP (ref 13–44)
LYMPHOCYTES # BLD AUTO: 24.4 % — SIGNIFICANT CHANGE UP (ref 13–44)
LYMPHOCYTES # BLD AUTO: 24.8 % — SIGNIFICANT CHANGE UP (ref 13–44)
LYMPHOCYTES # BLD AUTO: 25.9 % — SIGNIFICANT CHANGE UP (ref 13–44)
LYMPHOCYTES # BLD AUTO: 3.44 K/UL — HIGH (ref 1–3.3)
LYMPHOCYTES # BLD AUTO: 30.4 % — SIGNIFICANT CHANGE UP (ref 13–44)
LYMPHOCYTES # BLD AUTO: 4.68 K/UL — HIGH (ref 1–3.3)
LYMPHOCYTES # BLD AUTO: 4.68 K/UL — HIGH (ref 1–3.3)
MACROCYTES BLD QL: SLIGHT — SIGNIFICANT CHANGE UP
MAGNESIUM SERPL-MCNC: 1.9 MG/DL
MCHC RBC-ENTMCNC: 29.2 PG — SIGNIFICANT CHANGE UP (ref 27–34)
MCHC RBC-ENTMCNC: 31 G/DL — LOW (ref 32–36)
MCHC RBC-ENTMCNC: 31.1 G/DL — LOW (ref 32–36)
MCHC RBC-ENTMCNC: 31.1 G/DL — LOW (ref 32–36)
MCHC RBC-ENTMCNC: 31.5 G/DL — LOW (ref 32–36)
MCHC RBC-ENTMCNC: 32.1 G/DL — SIGNIFICANT CHANGE UP (ref 32–36)
MCHC RBC-ENTMCNC: 32.1 G/DL — SIGNIFICANT CHANGE UP (ref 32–36)
MCHC RBC-ENTMCNC: 32.2 G/DL — SIGNIFICANT CHANGE UP (ref 32–36)
MCHC RBC-ENTMCNC: 32.3 G/DL — SIGNIFICANT CHANGE UP (ref 32–36)
MCHC RBC-ENTMCNC: 32.5 G/DL — SIGNIFICANT CHANGE UP (ref 32–36)
MCHC RBC-ENTMCNC: 32.6 G/DL — SIGNIFICANT CHANGE UP (ref 32–36)
MCHC RBC-ENTMCNC: 32.6 PG — SIGNIFICANT CHANGE UP (ref 27–34)
MCHC RBC-ENTMCNC: 32.8 GM/DL — SIGNIFICANT CHANGE UP (ref 32–36)
MCHC RBC-ENTMCNC: 32.8 PG — SIGNIFICANT CHANGE UP (ref 27–34)
MCHC RBC-ENTMCNC: 32.8 PG — SIGNIFICANT CHANGE UP (ref 27–34)
MCHC RBC-ENTMCNC: 33.5 PG — SIGNIFICANT CHANGE UP (ref 27–34)
MCHC RBC-ENTMCNC: 33.7 PG — SIGNIFICANT CHANGE UP (ref 27–34)
MCHC RBC-ENTMCNC: 34.1 PG — HIGH (ref 27–34)
MCHC RBC-ENTMCNC: 34.5 PG — HIGH (ref 27–34)
MCHC RBC-ENTMCNC: 34.9 PG — HIGH (ref 27–34)
MCHC RBC-ENTMCNC: 35.3 PG — HIGH (ref 27–34)
MCHC RBC-ENTMCNC: 35.3 PG — HIGH (ref 27–34)
MCV RBC AUTO: 101.1 FL — HIGH (ref 80–100)
MCV RBC AUTO: 104.1 FL — HIGH (ref 80–100)
MCV RBC AUTO: 104.5 FL — HIGH (ref 80–100)
MCV RBC AUTO: 105.4 FL — HIGH (ref 80–100)
MCV RBC AUTO: 105.4 FL — HIGH (ref 80–100)
MCV RBC AUTO: 108.3 FL — HIGH (ref 80–100)
MCV RBC AUTO: 108.3 FL — HIGH (ref 80–100)
MCV RBC AUTO: 108.6 FL — HIGH (ref 80–100)
MCV RBC AUTO: 109.4 FL — HIGH (ref 80–100)
MCV RBC AUTO: 110 FL — HIGH (ref 80–100)
MCV RBC AUTO: 88.9 FL — SIGNIFICANT CHANGE UP (ref 80–100)
MONOCYTES # BLD AUTO: 0.51 K/UL — SIGNIFICANT CHANGE UP (ref 0–0.9)
MONOCYTES # BLD AUTO: 0.55 K/UL — SIGNIFICANT CHANGE UP (ref 0–0.9)
MONOCYTES # BLD AUTO: 0.64 K/UL — SIGNIFICANT CHANGE UP (ref 0–0.9)
MONOCYTES # BLD AUTO: 0.64 K/UL — SIGNIFICANT CHANGE UP (ref 0–0.9)
MONOCYTES # BLD AUTO: 0.68 K/UL — SIGNIFICANT CHANGE UP (ref 0–0.9)
MONOCYTES # BLD AUTO: 0.73 K/UL — SIGNIFICANT CHANGE UP (ref 0–0.9)
MONOCYTES # BLD AUTO: 0.77 K/UL — SIGNIFICANT CHANGE UP (ref 0–0.9)
MONOCYTES # BLD AUTO: 0.8 K/UL — SIGNIFICANT CHANGE UP (ref 0–0.9)
MONOCYTES # BLD AUTO: 0.86 K/UL — SIGNIFICANT CHANGE UP (ref 0–0.9)
MONOCYTES # BLD AUTO: 2.49 K/UL — HIGH (ref 0–0.9)
MONOCYTES NFR BLD AUTO: 11.1 % — SIGNIFICANT CHANGE UP (ref 2–14)
MONOCYTES NFR BLD AUTO: 12.9 % — SIGNIFICANT CHANGE UP (ref 2–14)
MONOCYTES NFR BLD AUTO: 13.3 % — SIGNIFICANT CHANGE UP (ref 2–14)
MONOCYTES NFR BLD AUTO: 3 % — SIGNIFICANT CHANGE UP (ref 2–14)
MONOCYTES NFR BLD AUTO: 3 % — SIGNIFICANT CHANGE UP (ref 2–14)
MONOCYTES NFR BLD AUTO: 5.8 % — SIGNIFICANT CHANGE UP (ref 2–14)
MONOCYTES NFR BLD AUTO: 6.2 % — SIGNIFICANT CHANGE UP (ref 2–14)
MONOCYTES NFR BLD AUTO: 6.7 % — SIGNIFICANT CHANGE UP (ref 2–14)
MONOCYTES NFR BLD AUTO: 7.5 % — SIGNIFICANT CHANGE UP (ref 2–14)
MONOCYTES NFR BLD AUTO: 9 % — SIGNIFICANT CHANGE UP (ref 2–14)
NEUTROPHILS # BLD AUTO: 15.73 K/UL — HIGH (ref 1.8–7.4)
NEUTROPHILS # BLD AUTO: 15.73 K/UL — HIGH (ref 1.8–7.4)
NEUTROPHILS # BLD AUTO: 2.63 K/UL — SIGNIFICANT CHANGE UP (ref 1.8–7.4)
NEUTROPHILS # BLD AUTO: 22.45 K/UL — HIGH (ref 1.8–7.4)
NEUTROPHILS # BLD AUTO: 3.18 K/UL — SIGNIFICANT CHANGE UP (ref 1.8–7.4)
NEUTROPHILS # BLD AUTO: 4.39 K/UL — SIGNIFICANT CHANGE UP (ref 1.8–7.4)
NEUTROPHILS # BLD AUTO: 4.86 K/UL — SIGNIFICANT CHANGE UP (ref 1.8–7.4)
NEUTROPHILS # BLD AUTO: 7.63 K/UL — HIGH (ref 1.8–7.4)
NEUTROPHILS # BLD AUTO: 8.45 K/UL — HIGH (ref 1.8–7.4)
NEUTROPHILS # BLD AUTO: 9.27 K/UL — HIGH (ref 1.8–7.4)
NEUTROPHILS NFR BLD AUTO: 52.9 % — SIGNIFICANT CHANGE UP (ref 43–77)
NEUTROPHILS NFR BLD AUTO: 60.2 % — SIGNIFICANT CHANGE UP (ref 43–77)
NEUTROPHILS NFR BLD AUTO: 63.7 % — SIGNIFICANT CHANGE UP (ref 43–77)
NEUTROPHILS NFR BLD AUTO: 64 % — SIGNIFICANT CHANGE UP (ref 43–77)
NEUTROPHILS NFR BLD AUTO: 67.8 % — SIGNIFICANT CHANGE UP (ref 43–77)
NEUTROPHILS NFR BLD AUTO: 71.7 % — SIGNIFICANT CHANGE UP (ref 43–77)
NEUTROPHILS NFR BLD AUTO: 74 % — SIGNIFICANT CHANGE UP (ref 43–77)
NEUTROPHILS NFR BLD AUTO: 74 % — SIGNIFICANT CHANGE UP (ref 43–77)
NEUTROPHILS NFR BLD AUTO: 79 % — HIGH (ref 43–77)
NEUTROPHILS NFR BLD AUTO: 81 % — HIGH (ref 43–77)
NRBC # BLD: 0 /100 WBCS — SIGNIFICANT CHANGE UP (ref 0–0)
NRBC # BLD: SIGNIFICANT CHANGE UP /100 WBCS (ref 0–0)
PLAT MORPH BLD: NORMAL — SIGNIFICANT CHANGE UP
PLATELET # BLD AUTO: 154 K/UL — SIGNIFICANT CHANGE UP (ref 150–400)
PLATELET # BLD AUTO: 201 K/UL — SIGNIFICANT CHANGE UP (ref 150–400)
PLATELET # BLD AUTO: 236 K/UL — SIGNIFICANT CHANGE UP (ref 150–400)
PLATELET # BLD AUTO: 37 K/UL — LOW (ref 150–400)
PLATELET # BLD AUTO: 386 K/UL — SIGNIFICANT CHANGE UP (ref 150–400)
PLATELET # BLD AUTO: 40 K/UL — LOW (ref 150–400)
PLATELET # BLD AUTO: 408 K/UL — HIGH (ref 150–400)
PLATELET # BLD AUTO: 408 K/UL — HIGH (ref 150–400)
PLATELET # BLD AUTO: 49 K/UL — LOW (ref 150–400)
PLATELET # BLD AUTO: 52 K/UL — LOW (ref 150–400)
PLATELET # BLD AUTO: 86 K/UL — LOW (ref 150–400)
POIKILOCYTOSIS BLD QL AUTO: SLIGHT — SIGNIFICANT CHANGE UP
POLYCHROMASIA BLD QL SMEAR: SLIGHT — SIGNIFICANT CHANGE UP
POTASSIUM SERPL-MCNC: 4.3 MMOL/L — SIGNIFICANT CHANGE UP (ref 3.5–5.3)
POTASSIUM SERPL-MCNC: 4.4 MMOL/L — SIGNIFICANT CHANGE UP (ref 3.5–5.3)
POTASSIUM SERPL-MCNC: 4.5 MMOL/L — SIGNIFICANT CHANGE UP (ref 3.5–5.3)
POTASSIUM SERPL-MCNC: 4.7 MMOL/L — SIGNIFICANT CHANGE UP (ref 3.5–5.3)
POTASSIUM SERPL-MCNC: 4.8 MMOL/L — SIGNIFICANT CHANGE UP (ref 3.5–5.3)
POTASSIUM SERPL-MCNC: 5.1 MMOL/L — SIGNIFICANT CHANGE UP (ref 3.5–5.3)
POTASSIUM SERPL-MCNC: 5.2 MMOL/L — SIGNIFICANT CHANGE UP (ref 3.5–5.3)
POTASSIUM SERPL-SCNC: 4 MMOL/L
POTASSIUM SERPL-SCNC: 4.3 MMOL/L — SIGNIFICANT CHANGE UP (ref 3.5–5.3)
POTASSIUM SERPL-SCNC: 4.4 MMOL/L — SIGNIFICANT CHANGE UP (ref 3.5–5.3)
POTASSIUM SERPL-SCNC: 4.5 MMOL/L — SIGNIFICANT CHANGE UP (ref 3.5–5.3)
POTASSIUM SERPL-SCNC: 4.7 MMOL/L
POTASSIUM SERPL-SCNC: 4.7 MMOL/L — SIGNIFICANT CHANGE UP (ref 3.5–5.3)
POTASSIUM SERPL-SCNC: 4.8 MMOL/L — SIGNIFICANT CHANGE UP (ref 3.5–5.3)
POTASSIUM SERPL-SCNC: 5.1 MMOL/L — SIGNIFICANT CHANGE UP (ref 3.5–5.3)
POTASSIUM SERPL-SCNC: 5.2 MMOL/L — SIGNIFICANT CHANGE UP (ref 3.5–5.3)
PROT SERPL-MCNC: 7.3 G/DL
PROT SERPL-MCNC: 7.3 G/DL — SIGNIFICANT CHANGE UP (ref 6–8.3)
PROT SERPL-MCNC: 7.5 G/DL — SIGNIFICANT CHANGE UP (ref 6–8.3)
PROT SERPL-MCNC: 7.6 G/DL — SIGNIFICANT CHANGE UP (ref 6–8.3)
PROT SERPL-MCNC: 7.7 G/DL — SIGNIFICANT CHANGE UP (ref 6–8.3)
PROT SERPL-MCNC: 7.7 G/DL — SIGNIFICANT CHANGE UP (ref 6–8.3)
PROT SERPL-MCNC: 7.8 G/DL
PROT SERPL-MCNC: 7.8 G/DL — SIGNIFICANT CHANGE UP (ref 6–8.3)
PROT SERPL-MCNC: 8.3 G/DL — SIGNIFICANT CHANGE UP (ref 6–8.3)
RBC # BLD: 2.32 M/UL — LOW (ref 4.2–5.8)
RBC # BLD: 2.41 M/UL — LOW (ref 4.2–5.8)
RBC # BLD: 2.49 M/UL — LOW (ref 4.2–5.8)
RBC # BLD: 2.55 M/UL — LOW (ref 4.2–5.8)
RBC # BLD: 2.55 M/UL — LOW (ref 4.2–5.8)
RBC # BLD: 2.66 M/UL — LOW (ref 4.2–5.8)
RBC # BLD: 2.79 M/UL — LOW (ref 4.2–5.8)
RBC # BLD: 3.12 M/UL — LOW (ref 4.2–5.8)
RBC # BLD: 3.14 M/UL — LOW (ref 4.2–5.8)
RBC # BLD: 3.14 M/UL — LOW (ref 4.2–5.8)
RBC # BLD: 4.32 M/UL — SIGNIFICANT CHANGE UP (ref 4.2–5.8)
RBC # FLD: 17.2 % — HIGH (ref 10.3–14.5)
RBC # FLD: 17.3 % — HIGH (ref 10.3–14.5)
RBC # FLD: 17.4 % — HIGH (ref 10.3–14.5)
RBC # FLD: 17.5 % — HIGH (ref 10.3–14.5)
RBC # FLD: 17.9 % — HIGH (ref 10.3–14.5)
RBC # FLD: 18.6 % — HIGH (ref 10.3–14.5)
RBC # FLD: 18.7 % — HIGH (ref 10.3–14.5)
RBC # FLD: 18.8 % — HIGH (ref 10.3–14.5)
RBC # FLD: 19.7 % — HIGH (ref 10.3–14.5)
RBC BLD AUTO: ABNORMAL
SODIUM SERPL-SCNC: 133 MMOL/L — LOW (ref 135–145)
SODIUM SERPL-SCNC: 134 MMOL/L
SODIUM SERPL-SCNC: 134 MMOL/L — LOW (ref 135–145)
SODIUM SERPL-SCNC: 135 MMOL/L
SODIUM SERPL-SCNC: 135 MMOL/L — SIGNIFICANT CHANGE UP (ref 135–145)
SODIUM SERPL-SCNC: 136 MMOL/L — SIGNIFICANT CHANGE UP (ref 135–145)
SODIUM SERPL-SCNC: 139 MMOL/L — SIGNIFICANT CHANGE UP (ref 135–145)
TSH SERPL-MCNC: 0.11 UIU/ML — LOW (ref 0.27–4.2)
TSH SERPL-MCNC: 0.14 UIU/ML — LOW (ref 0.27–4.2)
TSH SERPL-MCNC: 0.28 UIU/ML — SIGNIFICANT CHANGE UP (ref 0.27–4.2)
TSH SERPL-MCNC: 0.5 UIU/ML — SIGNIFICANT CHANGE UP (ref 0.27–4.2)
TSH SERPL-MCNC: 0.55 UIU/ML — SIGNIFICANT CHANGE UP (ref 0.27–4.2)
TSH SERPL-MCNC: 2.18 UIU/ML — SIGNIFICANT CHANGE UP (ref 0.27–4.2)
TSH SERPL-MCNC: 2.72 UIU/ML — SIGNIFICANT CHANGE UP (ref 0.27–4.2)
URATE SERPL-MCNC: 6.6 MG/DL — SIGNIFICANT CHANGE UP (ref 3.4–8.8)
URATE SERPL-MCNC: 6.7 MG/DL — SIGNIFICANT CHANGE UP (ref 3.4–8.8)
URATE SERPL-MCNC: 6.8 MG/DL — SIGNIFICANT CHANGE UP (ref 3.4–8.8)
URATE SERPL-MCNC: 7 MG/DL — SIGNIFICANT CHANGE UP (ref 3.4–8.8)
URATE SERPL-MCNC: 7.1 MG/DL — SIGNIFICANT CHANGE UP (ref 3.4–8.8)
URATE SERPL-MCNC: 7.4 MG/DL — SIGNIFICANT CHANGE UP (ref 3.4–8.8)
URATE SERPL-MCNC: 7.4 MG/DL — SIGNIFICANT CHANGE UP (ref 3.4–8.8)
WBC # BLD: 11.74 K/UL — HIGH (ref 3.8–10.5)
WBC # BLD: 11.92 K/UL — HIGH (ref 3.8–10.5)
WBC # BLD: 13.27 K/UL — HIGH (ref 3.8–10.5)
WBC # BLD: 21.26 K/UL — HIGH (ref 3.8–10.5)
WBC # BLD: 21.26 K/UL — HIGH (ref 3.8–10.5)
WBC # BLD: 27.71 K/UL — HIGH (ref 3.8–10.5)
WBC # BLD: 4.97 K/UL — SIGNIFICANT CHANGE UP (ref 3.8–10.5)
WBC # BLD: 5.28 K/UL — SIGNIFICANT CHANGE UP (ref 3.8–10.5)
WBC # BLD: 6.04 K/UL — SIGNIFICANT CHANGE UP (ref 3.8–10.5)
WBC # BLD: 6.47 K/UL — SIGNIFICANT CHANGE UP (ref 3.8–10.5)
WBC # BLD: 6.79 K/UL — SIGNIFICANT CHANGE UP (ref 3.8–10.5)
WBC # FLD AUTO: 11.74 K/UL — HIGH (ref 3.8–10.5)
WBC # FLD AUTO: 11.92 K/UL — HIGH (ref 3.8–10.5)
WBC # FLD AUTO: 13.27 K/UL — HIGH (ref 3.8–10.5)
WBC # FLD AUTO: 21.26 K/UL — HIGH (ref 3.8–10.5)
WBC # FLD AUTO: 21.26 K/UL — HIGH (ref 3.8–10.5)
WBC # FLD AUTO: 27.71 K/UL — HIGH (ref 3.8–10.5)
WBC # FLD AUTO: 4.97 K/UL — SIGNIFICANT CHANGE UP (ref 3.8–10.5)
WBC # FLD AUTO: 5.28 K/UL — SIGNIFICANT CHANGE UP (ref 3.8–10.5)
WBC # FLD AUTO: 6.04 K/UL — SIGNIFICANT CHANGE UP (ref 3.8–10.5)
WBC # FLD AUTO: 6.47 K/UL — SIGNIFICANT CHANGE UP (ref 3.8–10.5)
WBC # FLD AUTO: 6.79 K/UL — SIGNIFICANT CHANGE UP (ref 3.8–10.5)

## 2024-01-01 PROCEDURE — 99214 OFFICE O/P EST MOD 30 MIN: CPT

## 2024-01-01 PROCEDURE — 86901 BLOOD TYPING SEROLOGIC RH(D): CPT

## 2024-01-01 PROCEDURE — 77387B: CUSTOM | Mod: 26

## 2024-01-01 PROCEDURE — 71275 CT ANGIOGRAPHY CHEST: CPT | Mod: 26,MH

## 2024-01-01 PROCEDURE — 74177 CT ABD & PELVIS W/CONTRAST: CPT | Mod: 26,MH

## 2024-01-01 PROCEDURE — 77427 RADIATION TX MANAGEMENT X5: CPT

## 2024-01-01 PROCEDURE — 92526 ORAL FUNCTION THERAPY: CPT | Mod: GN

## 2024-01-01 PROCEDURE — 77014: CPT | Mod: 26

## 2024-01-01 PROCEDURE — 77300 RADIATION THERAPY DOSE PLAN: CPT | Mod: 26

## 2024-01-01 PROCEDURE — 71046 X-RAY EXAM CHEST 2 VIEWS: CPT

## 2024-01-01 PROCEDURE — 70491 CT SOFT TISSUE NECK W/DYE: CPT | Mod: 26,MH

## 2024-01-01 PROCEDURE — 77338 DESIGN MLC DEVICE FOR IMRT: CPT | Mod: 26

## 2024-01-01 PROCEDURE — 71275 CT ANGIOGRAPHY CHEST: CPT

## 2024-01-01 PROCEDURE — 86900 BLOOD TYPING SEROLOGIC ABO: CPT

## 2024-01-01 PROCEDURE — 99215 OFFICE O/P EST HI 40 MIN: CPT

## 2024-01-01 PROCEDURE — 86850 RBC ANTIBODY SCREEN: CPT

## 2024-01-01 PROCEDURE — 74177 CT ABD & PELVIS W/CONTRAST: CPT

## 2024-01-01 PROCEDURE — 70491 CT SOFT TISSUE NECK W/DYE: CPT

## 2024-01-01 PROCEDURE — 77334 RADIATION TREATMENT AID(S): CPT | Mod: 26

## 2024-01-01 PROCEDURE — 86923 COMPATIBILITY TEST ELECTRIC: CPT

## 2024-01-01 PROCEDURE — 77301 RADIOTHERAPY DOSE PLAN IMRT: CPT | Mod: 26

## 2024-01-01 PROCEDURE — 71046 X-RAY EXAM CHEST 2 VIEWS: CPT | Mod: 26

## 2024-01-01 PROCEDURE — 77263 THER RADIOLOGY TX PLNG CPLX: CPT

## 2024-01-01 RX ORDER — LEVOFLOXACIN 750 MG/1
750 TABLET, FILM COATED ORAL DAILY
Qty: 7 | Refills: 0 | Status: DISCONTINUED | COMMUNITY
Start: 2023-01-01 | End: 2024-01-01

## 2024-01-01 RX ORDER — AMOXICILLIN AND CLAVULANATE POTASSIUM 500; 125 MG/1; MG/1
500-125 TABLET, FILM COATED ORAL 3 TIMES DAILY
Qty: 30 | Refills: 0 | Status: ACTIVE | COMMUNITY
Start: 2024-01-01 | End: 1900-01-01

## 2024-01-01 RX ORDER — METOCLOPRAMIDE 10 MG/1
10 TABLET ORAL
Qty: 30 | Refills: 0 | Status: ACTIVE | COMMUNITY
Start: 2023-01-01 | End: 1900-01-01

## 2024-01-02 NOTE — VITALS
[Maximal Pain Intensity: 6/10] : 6/10 [Least Pain Intensity: 0/10] : 0/10 [Pain Description/Quality: ___] : Pain description/quality: [unfilled] [Pain Duration: ___] : Pain duration: [unfilled] [Pain Location: ___] : Pain Location: [unfilled] [Pain Interferes with ADLs] : Pain interferes with activities of daily living. [Opioid] : opioid [80: Normal activity with effort; some signs or symptoms of disease.] : 80: Normal activity with effort; some signs or symptoms of disease.  [ECOG Performance Status: 1 - Restricted in physically strenuous activity but ambulatory and able to carry out work of a light or sedentary nature] : Performance Status: 1 - Restricted in physically strenuous activity but ambulatory and able to carry out work of a light or sedentary nature, e.g., light house work, office work [0 - No Distress] : Distress Level: 0

## 2024-01-03 NOTE — HISTORY OF PRESENT ILLNESS
[de-identified] : Mr. 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 chemo treatment with Dr. Christiansen 9/28/2023. feels is responding to Rx. 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 he completed carbo w etoposide-

## 2024-01-03 NOTE — CONSULT LETTER
[Dear  ___] : Dear  [unfilled], [Courtesy Letter:] : I had the pleasure of seeing your patient, [unfilled], in my office today. [Please see my note below.] : Please see my note below. [Sincerely,] : Sincerely, [FreeTextEntry2] : Arnav Conroy MD ([place], [place]) [FreeTextEntry3] : Rancho Covarrubias MD, FACS     [facility] Associate Chair    Department of Otolaryngology  Professor Otolaryngology & Molecular Medicine [facility]

## 2024-01-03 NOTE — CONSULT LETTER
[Dear  ___] : Dear  [unfilled], [Courtesy Letter:] : I had the pleasure of seeing your patient, [unfilled], in my office today. [Please see my note below.] : Please see my note below. [Sincerely,] : Sincerely, [FreeTextEntry2] : Arnav Conroy MD ([place], [place]) [FreeTextEntry3] : Rancho Covarrubias MD, FACS     [facility] Associate Chair    Department of Otolaryngology  Professor Otolaryngology & Molecular Medicine [facility]

## 2024-01-03 NOTE — PHYSICAL EXAM
[FreeTextEntry1] : near CR on R parotid region.  small residual R upper neck, mobile [Midline] : trachea located in midline position [Normal] : no rashes

## 2024-01-03 NOTE — HISTORY OF PRESENT ILLNESS
[de-identified] : Mr. 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 chemo treatment with Dr. Christiansen 9/28/2023. feels is responding to Rx. 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 he completed carbo w etoposide-

## 2024-01-11 NOTE — HISTORY OF PRESENT ILLNESS
[facility]  ED Nurse Handoff Report    ED Chief complaint: Headache      ED Diagnosis:   Final diagnoses:   Acute intractable headache, unspecified headache type   Meningitis       Code Status: to be addressed by admitting    Allergies:   Allergies   Allergen Reactions     Droperidol      Droperidol      Droperidol Other (See Comments)       Patient Story: Was seen yesterday for same complaint of headache 10/10 pain.   Focused Assessment:  C/o headache. Chills. Febrile    Treatments and/or interventions provided: LP, IV ABX, IVF, Morphine, benadryl, reglan   Patient's response to treatments and/or interventions:     Abnormal Labs Resulted from Time of ED Arrival to Time of ED Departure   GLUCOSE BY METER - Abnormal       Result Value    GLUCOSE BY METER POCT 199 (*)    BASIC METABOLIC PANEL - Abnormal    Sodium 138      Potassium 3.8      Chloride 105      Carbon Dioxide (CO2) 24      Anion Gap 9      Urea Nitrogen 12      Creatinine 0.84      Calcium 8.3 (*)     Glucose 211 (*)     GFR Estimate >90     GLUCOSE CSF - Abnormal    Glucose CSF 99 (*)    PROTEIN TOTAL CSF - Abnormal    Protein total  (*)    CELL COUNT CSF - Abnormal    Tube Number 4      Color Colorless      Clarity Clear      Total Nucleated Cells 367 (*)     RBC Count 7 (*)      CT Head w/o Contrast   Final Result   IMPRESSION:   1.  No acute intracranial abnormality or significant change compared to the prior study.          To be done/followed up on inpatient unit:  meningitis treatment    Does this patient have any cognitive concerns?: na    Activity level - Baseline/Home:  Independent  Activity Level - Current:   Stand with Assist    Patient's Preferred language: Latvian   Needed?: No    Isolation: None  Infection: Not Applicable  Patient tested for COVID 19 prior to admission: YES  Bariatric?: No    Vital Signs:   Vitals:    05/21/22 0224 05/21/22 0442 05/21/22 0627 05/21/22 0803   BP: (!) 162/97   (!) 162/95  "  Pulse: 87  80 73   Resp: 25   18   Temp: 100.1  F (37.8  C) 100.1  F (37.8  C)  98.8  F (37.1  C)   TempSrc: Oral Oral  Oral   SpO2: 97%  97% 94%   Weight: 95.3 kg (210 lb)      Height: 1.803 m (5' 11\")          Cardiac Rhythm:     Was the PSS-3 completed:   Yes  What interventions are required if any?               Family Comments: na  OBS brochure/video discussed/provided to patient/family: No              Name of person given brochure if not patient: na              Relationship to patient: na    For the majority of the shift this patient's behavior was Green.   Behavioral interventions performed were na.    ED NURSE PHONE NUMBER: [phone]         " [FreeTextEntry1] : Mr. 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  History: Around middle of June 2023, he noticed small lump on his right side of the neck while shaving. Within weeks, the lump grew rapidly in size for which he saw his PCP in Aug who then referred him to Dr. Covarrubias for furtehr eval. He had some delay in seeking care due to deaths in the family and wife who was hospitalized. He endrosed unintentional weight loss 173 -> 152 over the past 8 months. The mass is fixed, immobile, tender to palpation and is associated with pressure in his ear. He denied hearing loss, SOB, dysphagia.  He is a current smoker (15 cigarettes per day) since age 13. Denied alcohol and illicit drug use.  Imaging and path: 8/3/2023 sonogram showed right neck mass corresponds to multiple hypoechoic nodules at level 2, measuring 6.7cm, 2.3cm, 3.4cm and 1.7cm  8/25/2023 MRI [facility] revealed a large right sided neck mass with lymphadenopathy is present highly suspicious for carcinoma with metastatic lymphadenopathy including in the right retropharyngeal node in the right jugular chain. The origin of the mass may be parotid.  9/14/2023- Intensely hypermetabolic RIGHT neck mass, with revisualized ipsilateral hypermetabolic lymph nodes. There are findings now suspicious for possible contralateral parmjit metastases, which were not present on the prior MRI. Mildly avid sub centimeter LEFT lung nodule may be inflammatory given the patient's emphysema, however further evaluation is suggested to exclude metastatic malignancy. Findings in the RIGHT LOWER lobe are favored to be nodular atelectasis.  9/15/2023- He underwent right neck mass, US guided FNA, path c/w round blue cell tumor 9/20/2023 - He underwent core biopsy of right neck 8.0 cm x 2 lymph node. Lymph A c/w metastatic small cell carcinoma. Lymph node B-necrotic lymph node   10/20/23: Patient seen today for follow up accompanied by his wife. He received C1 9/28-9/30 and tolerated overall well. They report that he experienced fatigue and tiredness for a few days following his treatment but every day improved. He mentions a few episodes of dizziness during that time. All of his symptoms have since resolved. He did not experience fever, SOB, N/V/D, pain, neuropathy. He notes that he takes OTC docusate BID for constipation. They report that the mass on his right neck has substantially decreased in size and also feel that the lesion on his scalp has also decreased in size and is barely noticeable now.  11/10/23: Pt reports for follow up. He notes significant decrease in size of the rt submandibular/parotid mass. He has been tolerating treatment well. He continues to have fatigue, and weakness. He has lost weight. Dizziness episodes have resolved. The scalp lesion has completely resolved.  12/1/23: On chemo since 9/28/2023. Has been feeling fatigued and has lost weight due to anorexia. feels is responding to Rx. The neck mass is very small now and he has no pain. after last tx, the neulasta Onpro malfunctioned and pt seemingly did not get the medication. Nevertheless, he seems to have done OK and a week later, when we followed up, pt did not want to come for labs. He reports some cough and dyspnea.  12/12/23: Pt reports severe fatigue. he denies fever, chills, diarrhea or constipation. He notes the neck mass is smaller. he does have some lingering cough and dyspnea.   Disease: Extensive stage small cell cancer   Pathology: Small cell cancer   12/5/23  HEAD/NECK: Right neck soft tissue mass is smaller with decreased activity. This now exhibits moderate uptake predominantly at its periphery and exhibits increasing calcification (2.7 x 2.1 cm, SUV 4.6, image 59, dedicated head and neck series, previously 5.2 x 3.6 cm, SUV 18.0). Adenoids, tonsils, nasopharynx, oropharynx, hypopharynx, larynx, and trachea without suspicious uptake, mass, or other anatomic abnormality. Tongue without suspicious findings. Vocal cords exhibit symmetric, physiologic activity.  Lymph nodes are again seen, with improved size and uptake. For example, a node again present posterior to the primary mass (0.7 x 0.5 cm, SUV 1.7, image 59, previously 1.2 x 0.9 cm, SUV 7.7).  Intensely FDG-avid LEFT LOWER lobe consolidation has enlarged since most recent CT from 11/21/2023. There are also variable changes in the distribution and intensity of previously seen lung nodules since the prior PET/CT. Differential diagnosis favors infection and/or worsening LEFT LOWER lobe aspiration due to the short time course, but malignancy is not excluded. Concurrent increasing slight prominence of some mediastinal nodes may be related to this process. Follow-up chest CT may be helpful to assess for change. If there suspicion for a separate pulmonary malignancy, tissue sampling may be employed. 2.  Response to therapy in the neck, with decreased activity and size of dominant RIGHT neck mass, with concurrent similar changes at lymph nodes. A LEFT scalp lesion appears to have resolved. 3.  Punctate focus of mild uptake at LEFT external ear, nonspecific.  He reports reduction in the size of the right-sided neck mass, he denies any difficulty with swallowing pain.

## 2024-01-11 NOTE — REVIEW OF SYSTEMS
[Loss of Hearing] : loss of hearing [Mucosal Pain] : mucosal pain [Easy Bruising] : a tendency for easy bruising [Negative] : Endocrine [Dysphagia] : no dysphagia [Nosebleeds] : no nosebleeds [Hoarseness] : no hoarseness [Odynophagia] : no odynophagia [Easy Bleeding] : no tendency for easy bleeding [Swollen Glands] : no swollen glands [FreeTextEntry2] : 20-25 lbs over last few months

## 2024-01-16 NOTE — HISTORY OF PRESENT ILLNESS
[Disease: _____________________] : Disease: [unfilled] [ECOG Performance Status: 1 - Restricted in physically strenuous activity but ambulatory and able to carry out work of a light or sedentary nature] : Performance Status: 1 - Restricted in physically strenuous activity but ambulatory and able to carry out work of a light or sedentary nature, e.g., light house work, office work [de-identified] : Mr. 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   History:  Around middle of June 2023, he noticed small lump on his right side of the neck while shaving. Within weeks, the lump grew rapidly in size for which he saw his PCP in Aug who then referred him to Dr. Covarrubias for furtehr eval. He had some delay in seeking care due to deaths in the family and wife who was hospitalized. He endrosed unintentional weight loss 173 -> 152 over the past 8 months. The mass is fixed, immobile, tender to palpation and is associated with pressure in his ear. He denied hearing loss, SOB, dysphagia.   He is a current smoker (15 cigarettes per day) since age 13. Denied alcohol and illicit drug use.   Imaging and path:  8/3/2023 sonogram showed right neck mass corresponds to multiple hypoechoic nodules at level 2, measuring 6.7cm, 2.3cm, 3.4cm and 1.7cm  8/25/2023 MRI STN revealed a large right sided neck mass with lymphadenopathy is present highly suspicious for carcinoma with metastatic lymphadenopathy including in the right retropharyngeal node in the right jugular chain. The origin of the mass may be parotid.  9/14/2023- Intensely hypermetabolic RIGHT neck mass, with revisualized ipsilateral hypermetabolic lymph nodes. There are findings now suspicious for possible contralateral parmjit metastases, which were not present on the prior MRI. Mildly avid sub centimeter LEFT lung nodule may be inflammatory given the patient's emphysema, however further evaluation is suggested to exclude metastatic malignancy. Findings in the RIGHT LOWER lobe are favored to be nodular atelectasis.  9/15/2023- He underwent right neck mass, US guided FNA, path c/w round blue cell tumor 9/20/2023 - He underwent core biopsy of right neck 8.0 cm x 2 lymph node. Lymph A c/w metastatic small cell carcinoma. Lymph node B-necrotic lymph node   10/20/23: Patient seen today for follow up accompanied by his wife. He received C1 9/28-9/30 and tolerated overall well. They report that he experienced fatigue and tiredness for a few days following his treatment but every day improved. He mentions a few episodes of dizziness during that time. All of his symptoms have since resolved. He did not experience fever, SOB, N/V/D, pain, neuropathy. He notes that he takes OTC docusate BID for constipation. They report that the mass on his right neck has substantially decreased in size and also feel that the lesion on his scalp has also decreased in size and is barely noticeable now.   11/10/23: Pt reports for follow up. He notes significant decrease in size of the rt submandibular/parotid mass. He has been tolerating treatment well. He continues to have fatigue, and weakness. He has lost weight. Dizziness episodes have resolved. The scalp lesion has completely resolved.   12/1/23: On chemo since 9/28/2023. Has been feeling fatigued and has lost weight due to anorexia. feels is responding to Rx. The neck mass is very small now and he has no pain. after last tx, the neulasta Onpro malfunctioned and pt seemingly did not get the medication. Nevertheless, he seems to have done OK and a week later, when we followed up, pt did not want to come for labs. He reports some cough and dyspnea.   12/12/23: Pt reports severe fatigue. he denies fever, chills, diarrhea or constipation. He notes the neck mass is smaller. he does have some lingering cough and dyspnea.   1/9/24: Pt recently evaluated for consideration of XRT to R. 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 tissue. To date he has received induction chemotherapy   [de-identified] : Small cell cancer

## 2024-01-16 NOTE — PHYSICAL EXAM
[Restricted in physically strenuous activity but ambulatory and able to carry out work of a light or sedentary nature] : Status 1- Restricted in physically strenuous activity but ambulatory and able to carry out work of a light or sedentary nature, e.g., light house work, office work [Thin] : thin [Normal] : affect appropriate [de-identified] : marked decrease in size of parotid mass.  [de-identified] : Barrel chest, diffusely diminished breath sounds, bibasilar crackles and wheezing

## 2024-01-16 NOTE — ASSESSMENT
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 A primary has not been detected though given extensive and continuous smoking history and presence of LLL consolidation and opacity, a lung primary is reasonable.  Baseline MRI done on 10/28 shows no mets He started induction intent with carboplatin and etoposide and this started 9/28/2023 along with Neulasta which he tolerated well and has had a great response He saw Dr Bedolla for lung bx but it was decided to wait for the procedure till after next scan since the lesions may have responded to tx  #Small Cell Cancer (extensive stage; unknown primary) -s/p induction chemotherapy with reduction in neck mass.  -He was evaluated by Dr. Alarcon and he is going to receive 60Gy in 30 fx to neck mass.  -Most recent CT chest revealed a new SHAY 1.1 x 1.0 cm nodule but the additional nodules previously noted are stable in size and number.  -Lung findings have not been biopsied to date.  -We will plan for weekly carboplatin with XRT both as a radiation sensitizer and as continued systemic approach to lung findings. Will coordinate once RT treatment plan and schedule is complete -Once radiation is complete will continue systemic therapy with Tecentriq.   #anemia -s/p 2units PRBC on 12/14.  -CBC sent today. WIll f/u results.   #Scalp mass: scalp lesion bx c/w atypical spindle and pleomorphic proliferation involving fibroadipose tissue- perhaps suggesting a low grade sarcoma. -Continue to monitor  #Elevated Creatinine (mild) -Thought to be 2/2 dehydration. CMP sent today. Will f/u results.   OV 2 weeks.   We have requested calcitonin and polyoma testing on tissue to r/o MTC and Merkel cell ca respectively.

## 2024-01-16 NOTE — REVIEW OF SYSTEMS
[Fatigue] : fatigue [Recent Change In Weight] : ~T recent weight change [Shortness Of Breath] : shortness of breath [Cough] : cough [SOB on Exertion] : shortness of breath during exertion [Diarrhea: Grade 0] : Diarrhea: Grade 0 [Negative] : Allergic/Immunologic [Dry Eyes] : no dryness of the eyes [Vision Problems] : no vision problems [Dysphagia] : no dysphagia [Loss of Hearing] : no loss of hearing [Odynophagia] : no odynophagia [Chest Pain] : no chest pain [Palpitations] : no palpitations [Wheezing] : no wheezing [Abdominal Pain] : no abdominal pain [Vomiting] : no vomiting [Constipation] : no constipation [Dysuria] : no dysuria [Incontinence] : no incontinence [Joint Pain] : no joint pain [Joint Stiffness] : no joint stiffness [Confused] : no confusion [Dizziness] : no dizziness [Insomnia] : no insomnia

## 2024-01-26 PROBLEM — J18.9 PNEUMONIA: Status: ACTIVE | Noted: 2024-01-01

## 2024-01-26 PROBLEM — C80.1 SMALL CELL CARCINOMA: Status: ACTIVE | Noted: 2023-01-01

## 2024-01-26 NOTE — REVIEW OF SYSTEMS
[Fatigue] : fatigue [Recent Change In Weight] : ~T recent weight change [Dry Eyes] : no dryness of the eyes [Vision Problems] : no vision problems [Dysphagia] : no dysphagia [Loss of Hearing] : no loss of hearing [Odynophagia] : no odynophagia [Chest Pain] : no chest pain [Palpitations] : no palpitations [Shortness Of Breath] : shortness of breath [Wheezing] : no wheezing [Cough] : cough [SOB on Exertion] : shortness of breath during exertion [Abdominal Pain] : no abdominal pain [Vomiting] : no vomiting [Constipation] : no constipation [Diarrhea: Grade 0] : Diarrhea: Grade 0 [Dysuria] : no dysuria [Incontinence] : no incontinence [Joint Pain] : no joint pain [Joint Stiffness] : no joint stiffness [Confused] : no confusion [Dizziness] : no dizziness [Insomnia] : no insomnia [Negative] : Allergic/Immunologic

## 2024-01-26 NOTE — ASSESSMENT
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 A primary has not been detected though given extensive and continuous smoking history and presence of LLL consolidation and opacity, a lung primary is reasonable.  Baseline MRI done on 10/28 shows no mets He started induction intent with carboplatin and etoposide and this started 9/28/2023 along with Neulasta which he tolerated well and has had a great response He saw Dr Bedolla for lung bx but it was decided to wait for the procedure till after next scan since the lesions may have responded to tx  #Small Cell Cancer (extensive stage; unknown primary) -s/p induction chemotherapy with reduction in neck mass.  -He was evaluated by Dr. Alarcon and he is going to receive 60Gy in 30 fx to neck mass.  -Most recent CT chest revealed a new SHAY 1.1 x 1.0 cm nodule but the additional nodules previously noted are stable in size and number.  -Lung findings have not been biopsied to date.  -Plan is for weekly carboplatin with XRT both as a radiation sensitizer and as continued systemic approach to lung findings. Planned to start next week  Pt here for urgent visit with multitude of symptoms. I am concerned about an acute process such as PNA or PE. In fact, pt has not been smoking because it hurts his chest when he tries to smoke. He does not want to go to the hospital which I urged him to.  So,  will get CXR STAT CT PA protocol along with CT neck and abd STAT labs today with type and cross   Reiterated importance of going to ER if symptoms worsen or do not improve.

## 2024-01-26 NOTE — HISTORY OF PRESENT ILLNESS
[Disease: _____________________] : Disease: [unfilled] [de-identified] : Mr. 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   History:  Around middle of June 2023, he noticed small lump on his right side of the neck while shaving. Within weeks, the lump grew rapidly in size for which he saw his PCP in Aug who then referred him to Dr. Covarrubias for furtehr eval. He had some delay in seeking care due to deaths in the family and wife who was hospitalized. He endrosed unintentional weight loss 173 -> 152 over the past 8 months. The mass is fixed, immobile, tender to palpation and is associated with pressure in his ear. He denied hearing loss, SOB, dysphagia.   He is a current smoker (15 cigarettes per day) since age 13. Denied alcohol and illicit drug use.   Imaging and path:  8/3/2023 sonogram showed right neck mass corresponds to multiple hypoechoic nodules at level 2, measuring 6.7cm, 2.3cm, 3.4cm and 1.7cm  8/25/2023 MRI STN revealed a large right sided neck mass with lymphadenopathy is present highly suspicious for carcinoma with metastatic lymphadenopathy including in the right retropharyngeal node in the right jugular chain. The origin of the mass may be parotid.  9/14/2023- Intensely hypermetabolic RIGHT neck mass, with revisualized ipsilateral hypermetabolic lymph nodes. There are findings now suspicious for possible contralateral parmjit metastases, which were not present on the prior MRI. Mildly avid sub centimeter LEFT lung nodule may be inflammatory given the patient's emphysema, however further evaluation is suggested to exclude metastatic malignancy. Findings in the RIGHT LOWER lobe are favored to be nodular atelectasis.  9/15/2023- He underwent right neck mass, US guided FNA, path c/w round blue cell tumor 9/20/2023 - He underwent core biopsy of right neck 8.0 cm x 2 lymph node. Lymph A c/w metastatic small cell carcinoma. Lymph node B-necrotic lymph node   10/20/23: Patient seen today for follow up accompanied by his wife. He received C1 9/28-9/30 and tolerated overall well. They report that he experienced fatigue and tiredness for a few days following his treatment but every day improved. He mentions a few episodes of dizziness during that time. All of his symptoms have since resolved. He did not experience fever, SOB, N/V/D, pain, neuropathy. He notes that he takes OTC docusate BID for constipation. They report that the mass on his right neck has substantially decreased in size and also feel that the lesion on his scalp has also decreased in size and is barely noticeable now.   11/10/23: Pt reports for follow up. He notes significant decrease in size of the rt submandibular/parotid mass. He has been tolerating treatment well. He continues to have fatigue, and weakness. He has lost weight. Dizziness episodes have resolved. The scalp lesion has completely resolved.   12/1/23: On chemo since 9/28/2023. Has been feeling fatigued and has lost weight due to anorexia. feels is responding to Rx. The neck mass is very small now and he has no pain. after last tx, the neulasta Onpro malfunctioned and pt seemingly did not get the medication. Nevertheless, he seems to have done OK and a week later, when we followed up, pt did not want to come for labs. He reports some cough and dyspnea.   12/12/23: Pt reports severe fatigue. he denies fever, chills, diarrhea or constipation. He notes the neck mass is smaller. he does have some lingering cough and dyspnea.   1/9/24: Pt recently evaluated for consideration of XRT to R. 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 tissue. To date he has received induction chemotherapy   1/29/24: Feels tired, lost weigh, cough and expectorant sputum. He has pain in left lower ribs. Pleuritic CP  [de-identified] : Small cell cancer  [ECOG Performance Status: 1 - Restricted in physically strenuous activity but ambulatory and able to carry out work of a light or sedentary nature] : Performance Status: 1 - Restricted in physically strenuous activity but ambulatory and able to carry out work of a light or sedentary nature, e.g., light house work, office work

## 2024-01-26 NOTE — PHYSICAL EXAM
[Ambulatory and capable of all self care but unable to carry out any work activities] : Status 2- Ambulatory and capable of all self care but unable to carry out any work activities. Up and about more than 50% of waking hours [Thin] : thin [Normal] : affect appropriate [de-identified] : marked decrease in size of parotid mass.  [de-identified] : Barrel chest, diffusely diminished breath sounds, bibasilar crackles and wheezing

## 2024-02-07 NOTE — HISTORY OF PRESENT ILLNESS
[Disease: _____________________] : Disease: [unfilled] [de-identified] : Mr. 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   History:  Around middle of June 2023, he noticed small lump on his right side of the neck while shaving. Within weeks, the lump grew rapidly in size for which he saw his PCP in Aug who then referred him to Dr. Covarrubias for furtehr eval. He had some delay in seeking care due to deaths in the family and wife who was hospitalized. He endrosed unintentional weight loss 173 -> 152 over the past 8 months. The mass is fixed, immobile, tender to palpation and is associated with pressure in his ear. He denied hearing loss, SOB, dysphagia.   He is a current smoker (15 cigarettes per day) since age 13. Denied alcohol and illicit drug use.   Imaging and path:  8/3/2023 sonogram showed right neck mass corresponds to multiple hypoechoic nodules at level 2, measuring 6.7cm, 2.3cm, 3.4cm and 1.7cm  8/25/2023 MRI STN revealed a large right sided neck mass with lymphadenopathy is present highly suspicious for carcinoma with metastatic lymphadenopathy including in the right retropharyngeal node in the right jugular chain. The origin of the mass may be parotid.  9/14/2023- Intensely hypermetabolic RIGHT neck mass, with revisualized ipsilateral hypermetabolic lymph nodes. There are findings now suspicious for possible contralateral parmjit metastases, which were not present on the prior MRI. Mildly avid sub centimeter LEFT lung nodule may be inflammatory given the patient's emphysema, however further evaluation is suggested to exclude metastatic malignancy. Findings in the RIGHT LOWER lobe are favored to be nodular atelectasis.  9/15/2023- He underwent right neck mass, US guided FNA, path c/w round blue cell tumor 9/20/2023 - He underwent core biopsy of right neck 8.0 cm x 2 lymph node. Lymph A c/w metastatic small cell carcinoma. Lymph node B-necrotic lymph node   10/20/23: Patient seen today for follow up accompanied by his wife. He received C1 9/28-9/30 and tolerated overall well. They report that he experienced fatigue and tiredness for a few days following his treatment but every day improved. He mentions a few episodes of dizziness during that time. All of his symptoms have since resolved. He did not experience fever, SOB, N/V/D, pain, neuropathy. He notes that he takes OTC docusate BID for constipation. They report that the mass on his right neck has substantially decreased in size and also feel that the lesion on his scalp has also decreased in size and is barely noticeable now.   11/10/23: Pt reports for follow up. He notes significant decrease in size of the rt submandibular/parotid mass. He has been tolerating treatment well. He continues to have fatigue, and weakness. He has lost weight. Dizziness episodes have resolved. The scalp lesion has completely resolved.   12/1/23: On chemo since 9/28/2023. Has been feeling fatigued and has lost weight due to anorexia. feels is responding to Rx. The neck mass is very small now and he has no pain. after last tx, the neulasta Onpro malfunctioned and pt seemingly did not get the medication. Nevertheless, he seems to have done OK and a week later, when we followed up, pt did not want to come for labs. He reports some cough and dyspnea.   12/12/23: Pt reports severe fatigue. he denies fever, chills, diarrhea or constipation. He notes the neck mass is smaller. he does have some lingering cough and dyspnea.   1/9/24: Pt recently evaluated for consideration of XRT to R. 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 tissue. To date he has received induction chemotherapy   1/29/24: Feels tired, lost weigh, cough and expectorant sputum. He has pain in left lower ribs. Pleuritic CP  2/7/24: Pt returns for follow up. reports he is feeling much better. Appetite returning. Energy improved. Tolerating treatment. Pain resolved. COmpleted course of antibiotic for pneumonia;.  [de-identified] : Small cell cancer  [ECOG Performance Status: 1 - Restricted in physically strenuous activity but ambulatory and able to carry out work of a light or sedentary nature] : Performance Status: 1 - Restricted in physically strenuous activity but ambulatory and able to carry out work of a light or sedentary nature, e.g., light house work, office work

## 2024-02-07 NOTE — ASSESSMENT
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 A primary has not been detected though given extensive and continuous smoking history and presence of LLL consolidation and opacity, a lung primary is reasonable.  Baseline MRI done on 10/28 shows no mets He started induction intent with carboplatin and etoposide and this started 9/28/2023 along with Neulasta which he tolerated well and has had a great response He saw Dr Bedolla for lung bx but it was decided to wait for the procedure till after next scan since the lesions may have responded to tx  #Small Cell Cancer (extensive stage; unknown primary) -s/p induction chemotherapy with reduction in neck mass.  -He was evaluated by Dr. Alarcon and he is going to receive 60Gy in 30 fx to neck mass.  -Most recent CT chest revealed a new SHAY 1.1 x 1.0 cm nodule but the additional nodules previously noted are stable in size and number.  -Lung findings have not been biopsied to date.  -Plan is for weekly carboplatin with XRT both as a radiation sensitizer and as continued systemic approach to lung findings. Starting weekly carbo today. Hydration prn CBC reviewed from today WBC 13.27 HGB 8.9 Plt 201. Monitor hgb closely. Transfuse PRN F/U CMP s/p antibiotic for pneumonia.  OV weekly

## 2024-02-07 NOTE — REVIEW OF SYSTEMS
[Fatigue] : fatigue [Recent Change In Weight] : ~T recent weight change [Dry Eyes] : no dryness of the eyes [Vision Problems] : no vision problems [Dysphagia] : no dysphagia [Loss of Hearing] : no loss of hearing [Odynophagia] : no odynophagia [Chest Pain] : no chest pain [Palpitations] : no palpitations [Shortness Of Breath] : shortness of breath [Wheezing] : no wheezing [Cough] : cough [SOB on Exertion] : shortness of breath during exertion [Abdominal Pain] : no abdominal pain [Vomiting] : no vomiting [Constipation] : no constipation [Diarrhea: Grade 0] : Diarrhea: Grade 0 [Dysuria] : no dysuria [Incontinence] : no incontinence [Joint Pain] : no joint pain [Joint Stiffness] : no joint stiffness [Confused] : no confusion [Dizziness] : no dizziness [Insomnia] : no insomnia [Negative] : Allergic/Immunologic [FreeTextEntry2] : Improved [FreeTextEntry6] : Improved

## 2024-02-07 NOTE — PHYSICAL EXAM
[Ambulatory and capable of all self care but unable to carry out any work activities] : Status 2- Ambulatory and capable of all self care but unable to carry out any work activities. Up and about more than 50% of waking hours [Thin] : thin [Normal] : affect appropriate [de-identified] : marked decrease in size of parotid mass.  [de-identified] : Barrel chest, diffusely diminished breath sounds, bibasilar crackles and wheezing

## 2024-02-07 NOTE — DISEASE MANAGEMENT
[Clinical] : TNM Stage: c [IV] : IV [TTNM] : 3 [NTNM] : 2 [MTNM] : 0 [de-identified] : 200 [de-identified] : 3153 [de-identified] : salivary gland

## 2024-02-07 NOTE — REVIEW OF SYSTEMS
[Constipation: Grade 0] : Constipation: Grade 0 [Diarrhea: Grade 0] : Diarrhea: Grade 0 [Dysphagia: Grade 0] : Dysphagia: Grade 0 [Nausea: Grade 0] : Nausea: Grade 0 [Vomiting: Grade 0] : Vomiting: Grade 0 [Fatigue: Grade 1 - Fatigue relieved by rest] : Fatigue: Grade 1 - Fatigue relieved by rest [Mucositis Oral: Grade 0] : Mucositis Oral: Grade 0  [Xerostomia: Grade 0] : Xerostomia: Grade 0 [Oral Pain: Grade 0] : Oral Pain: Grade 0 [Cough: Grade 0] : Cough: Grade 0 [Hoarseness: Grade 0] : Hoarseness: Grade 0 [Skin Atrophy: Grade 0] : Skin Atrophy: Grade 0 [Skin Hyperpigmentation: Grade 0] : Skin Hyperpigmentation: Grade 0

## 2024-02-07 NOTE — HISTORY OF PRESENT ILLNESS
[FreeTextEntry1] : Mr. 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 Currently undergoing radiation.   History: Around middle of June 2023, he noticed small lump on his right side of the neck while shaving. Within weeks, the lump grew rapidly in size for which he saw his PCP in Aug who then referred him to Dr. Covarrubias for furtehr eval. He had some delay in seeking care due to deaths in the family and wife who was hospitalized. He endrosed unintentional weight loss 173 -> 152 over the past 8 months. The mass is fixed, immobile, tender to palpation and is associated with pressure in his ear. He denied hearing loss, SOB, dysphagia.  He is a current smoker (15 cigarettes per day) since age 13. Denied alcohol and illicit drug use.  Imaging and path: 8/3/2023 sonogram showed right neck mass corresponds to multiple hypoechoic nodules at level 2, measuring 6.7cm, 2.3cm, 3.4cm and 1.7cm  8/25/2023 MRI STN revealed a large right sided neck mass with lymphadenopathy is present highly suspicious for carcinoma with metastatic lymphadenopathy including in the right retropharyngeal node in the right jugular chain. The origin of the mass may be parotid.  9/14/2023- Intensely hypermetabolic RIGHT neck mass, with revisualized ipsilateral hypermetabolic lymph nodes. There are findings now suspicious for possible contralateral parmjit metastases, which were not present on the prior MRI. Mildly avid sub centimeter LEFT lung nodule may be inflammatory given the patient's emphysema, however further evaluation is suggested to exclude metastatic malignancy. Findings in the RIGHT LOWER lobe are favored to be nodular atelectasis.  9/15/2023- He underwent right neck mass, US guided FNA, path c/w round blue cell tumor 9/20/2023 - He underwent core biopsy of right neck 8.0 cm x 2 lymph node. Lymph A c/w metastatic small cell carcinoma. Lymph node B-necrotic lymph node   10/20/23: Patient seen today for follow up accompanied by his wife. He received C1 9/28-9/30 and tolerated overall well. They report that he experienced fatigue and tiredness for a few days following his treatment but every day improved. He mentions a few episodes of dizziness during that time. All of his symptoms have since resolved. He did not experience fever, SOB, N/V/D, pain, neuropathy. He notes that he takes OTC docusate BID for constipation. They report that the mass on his right neck has substantially decreased in size and also feel that the lesion on his scalp has also decreased in size and is barely noticeable now.  11/10/23: Pt reports for follow up. He notes significant decrease in size of the rt submandibular/parotid mass. He has been tolerating treatment well. He continues to have fatigue, and weakness. He has lost weight. Dizziness episodes have resolved. The scalp lesion has completely resolved.  12/1/23: On chemo since 9/28/2023. Has been feeling fatigued and has lost weight due to anorexia. feels is responding to Rx. The neck mass is very small now and he has no pain. after last tx, the neulasta Onpro malfunctioned and pt seemingly did not get the medication. Nevertheless, he seems to have done OK and a week later, when we followed up, pt did not want to come for labs. He reports some cough and dyspnea.  12/12/23: Pt reports severe fatigue. he denies fever, chills, diarrhea or constipation. He notes the neck mass is smaller. he does have some lingering cough and dyspnea.   Disease: Extensive stage small cell cancer   Pathology: Small cell cancer   12/5/23  HEAD/NECK: Right neck soft tissue mass is smaller with decreased activity. This now exhibits moderate uptake predominantly at its periphery and exhibits increasing calcification (2.7 x 2.1 cm, SUV 4.6, image 59, dedicated head and neck series, previously 5.2 x 3.6 cm, SUV 18.0). Adenoids, tonsils, nasopharynx, oropharynx, hypopharynx, larynx, and trachea without suspicious uptake, mass, or other anatomic abnormality. Tongue without suspicious findings. Vocal cords exhibit symmetric, physiologic activity.  Lymph nodes are again seen, with improved size and uptake. For example, a node again present posterior to the primary mass (0.7 x 0.5 cm, SUV 1.7, image 59, previously 1.2 x 0.9 cm, SUV 7.7).  Intensely FDG-avid LEFT LOWER lobe consolidation has enlarged since most recent CT from 11/21/2023. There are also variable changes in the distribution and intensity of previously seen lung nodules since the prior PET/CT. Differential diagnosis favors infection and/or worsening LEFT LOWER lobe aspiration due to the short time course, but malignancy is not excluded. Concurrent increasing slight prominence of some mediastinal nodes may be related to this process. Follow-up chest CT may be helpful to assess for change. If there suspicion for a separate pulmonary malignancy, tissue sampling may be employed. 2.  Response to therapy in the neck, with decreased activity and size of dominant RIGHT neck mass, with concurrent similar changes at lymph nodes. A LEFT scalp lesion appears to have resolved. 3.  Punctate focus of mild uptake at LEFT external ear, nonspecific.  He reports reduction in the size of the right-sided neck mass, he denies any difficulty with swallowing pain.  2/7/24 Presents for OTV. Completed fx 1/33. He is feeling well, some fatigue which he attributes to not sleeping well at night. Continues weekly chemotherapy. No pain, no difficulty swallowing. Reviewed RT schedule, potential side effects, skin care, and will meet with RD today.

## 2024-02-14 NOTE — HISTORY OF PRESENT ILLNESS
[FreeTextEntry1] : Mr. 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 Currently undergoing radiation.   History: Around middle of June 2023, he noticed small lump on his right side of the neck while shaving. Within weeks, the lump grew rapidly in size for which he saw his PCP in Aug who then referred him to Dr. Covarrubias for furtehr eval. He had some delay in seeking care due to deaths in the family and wife who was hospitalized. He endrosed unintentional weight loss 173 -> 152 over the past 8 months. The mass is fixed, immobile, tender to palpation and is associated with pressure in his ear. He denied hearing loss, SOB, dysphagia.  He is a current smoker (15 cigarettes per day) since age 13. Denied alcohol and illicit drug use.  Imaging and path: 8/3/2023 sonogram showed right neck mass corresponds to multiple hypoechoic nodules at level 2, measuring 6.7cm, 2.3cm, 3.4cm and 1.7cm  8/25/2023 MRI STN revealed a large right sided neck mass with lymphadenopathy is present highly suspicious for carcinoma with metastatic lymphadenopathy including in the right retropharyngeal node in the right jugular chain. The origin of the mass may be parotid.  9/14/2023- Intensely hypermetabolic RIGHT neck mass, with revisualized ipsilateral hypermetabolic lymph nodes. There are findings now suspicious for possible contralateral parmjit metastases, which were not present on the prior MRI. Mildly avid sub centimeter LEFT lung nodule may be inflammatory given the patient's emphysema, however further evaluation is suggested to exclude metastatic malignancy. Findings in the RIGHT LOWER lobe are favored to be nodular atelectasis.  9/15/2023- He underwent right neck mass, US guided FNA, path c/w round blue cell tumor 9/20/2023 - He underwent core biopsy of right neck 8.0 cm x 2 lymph node. Lymph A c/w metastatic small cell carcinoma. Lymph node B-necrotic lymph node   10/20/23: Patient seen today for follow up accompanied by his wife. He received C1 9/28-9/30 and tolerated overall well. They report that he experienced fatigue and tiredness for a few days following his treatment but every day improved. He mentions a few episodes of dizziness during that time. All of his symptoms have since resolved. He did not experience fever, SOB, N/V/D, pain, neuropathy. He notes that he takes OTC docusate BID for constipation. They report that the mass on his right neck has substantially decreased in size and also feel that the lesion on his scalp has also decreased in size and is barely noticeable now.  11/10/23: Pt reports for follow up. He notes significant decrease in size of the rt submandibular/parotid mass. He has been tolerating treatment well. He continues to have fatigue, and weakness. He has lost weight. Dizziness episodes have resolved. The scalp lesion has completely resolved.  12/1/23: On chemo since 9/28/2023. Has been feeling fatigued and has lost weight due to anorexia. feels is responding to Rx. The neck mass is very small now and he has no pain. after last tx, the neulasta Onpro malfunctioned and pt seemingly did not get the medication. Nevertheless, he seems to have done OK and a week later, when we followed up, pt did not want to come for labs. He reports some cough and dyspnea.  12/12/23: Pt reports severe fatigue. he denies fever, chills, diarrhea or constipation. He notes the neck mass is smaller. he does have some lingering cough and dyspnea.   Disease: Extensive stage small cell cancer   Pathology: Small cell cancer   12/5/23  HEAD/NECK: Right neck soft tissue mass is smaller with decreased activity. This now exhibits moderate uptake predominantly at its periphery and exhibits increasing calcification (2.7 x 2.1 cm, SUV 4.6, image 59, dedicated head and neck series, previously 5.2 x 3.6 cm, SUV 18.0). Adenoids, tonsils, nasopharynx, oropharynx, hypopharynx, larynx, and trachea without suspicious uptake, mass, or other anatomic abnormality. Tongue without suspicious findings. Vocal cords exhibit symmetric, physiologic activity.  Lymph nodes are again seen, with improved size and uptake. For example, a node again present posterior to the primary mass (0.7 x 0.5 cm, SUV 1.7, image 59, previously 1.2 x 0.9 cm, SUV 7.7).  Intensely FDG-avid LEFT LOWER lobe consolidation has enlarged since most recent CT from 11/21/2023. There are also variable changes in the distribution and intensity of previously seen lung nodules since the prior PET/CT. Differential diagnosis favors infection and/or worsening LEFT LOWER lobe aspiration due to the short time course, but malignancy is not excluded. Concurrent increasing slight prominence of some mediastinal nodes may be related to this process. Follow-up chest CT may be helpful to assess for change. If there suspicion for a separate pulmonary malignancy, tissue sampling may be employed. 2.  Response to therapy in the neck, with decreased activity and size of dominant RIGHT neck mass, with concurrent similar changes at lymph nodes. A LEFT scalp lesion appears to have resolved. 3.  Punctate focus of mild uptake at LEFT external ear, nonspecific.  He reports reduction in the size of the right-sided neck mass, he denies any difficulty with swallowing pain.  2/7/24 Presents for OTV. Completed fx 1/33. He is feeling well, some fatigue which he attributes to not sleeping well at night. Continues weekly chemotherapy. No pain, no difficulty swallowing. Reviewed RT schedule, potential side effects, skin care, and will meet with RD today.   2/14/2024 OTV Completed fx 5/33. Tolerating treatment well. Denies pain. Eating with no difficulty. Denies dysgeusia.  Skin and mouth care reviewed.

## 2024-02-14 NOTE — DISEASE MANAGEMENT
[Clinical] : TNM Stage: c [IV] : IV [TTNM] : 3 [NTNM] : 2 [MTNM] : 0 [de-identified] : 1000 [de-identified] : 9146 [de-identified] : salivary gland

## 2024-02-21 NOTE — HISTORY OF PRESENT ILLNESS
[FreeTextEntry1] : Mr. 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 Currently undergoing radiation.   History: Around middle of June 2023, he noticed small lump on his right side of the neck while shaving. Within weeks, the lump grew rapidly in size for which he saw his PCP in Aug who then referred him to Dr. Covarrubias for furtehr eval. He had some delay in seeking care due to deaths in the family and wife who was hospitalized. He endrosed unintentional weight loss 173 -> 152 over the past 8 months. The mass is fixed, immobile, tender to palpation and is associated with pressure in his ear. He denied hearing loss, SOB, dysphagia.  He is a current smoker (15 cigarettes per day) since age 13. Denied alcohol and illicit drug use.  Imaging and path: 8/3/2023 sonogram showed right neck mass corresponds to multiple hypoechoic nodules at level 2, measuring 6.7cm, 2.3cm, 3.4cm and 1.7cm  8/25/2023 MRI STN revealed a large right sided neck mass with lymphadenopathy is present highly suspicious for carcinoma with metastatic lymphadenopathy including in the right retropharyngeal node in the right jugular chain. The origin of the mass may be parotid.  9/14/2023- Intensely hypermetabolic RIGHT neck mass, with revisualized ipsilateral hypermetabolic lymph nodes. There are findings now suspicious for possible contralateral parmjit metastases, which were not present on the prior MRI. Mildly avid sub centimeter LEFT lung nodule may be inflammatory given the patient's emphysema, however further evaluation is suggested to exclude metastatic malignancy. Findings in the RIGHT LOWER lobe are favored to be nodular atelectasis.  9/15/2023- He underwent right neck mass, US guided FNA, path c/w round blue cell tumor 9/20/2023 - He underwent core biopsy of right neck 8.0 cm x 2 lymph node. Lymph A c/w metastatic small cell carcinoma. Lymph node B-necrotic lymph node   10/20/23: Patient seen today for follow up accompanied by his wife. He received C1 9/28-9/30 and tolerated overall well. They report that he experienced fatigue and tiredness for a few days following his treatment but every day improved. He mentions a few episodes of dizziness during that time. All of his symptoms have since resolved. He did not experience fever, SOB, N/V/D, pain, neuropathy. He notes that he takes OTC docusate BID for constipation. They report that the mass on his right neck has substantially decreased in size and also feel that the lesion on his scalp has also decreased in size and is barely noticeable now.  11/10/23: Pt reports for follow up. He notes significant decrease in size of the rt submandibular/parotid mass. He has been tolerating treatment well. He continues to have fatigue, and weakness. He has lost weight. Dizziness episodes have resolved. The scalp lesion has completely resolved.  12/1/23: On chemo since 9/28/2023. Has been feeling fatigued and has lost weight due to anorexia. feels is responding to Rx. The neck mass is very small now and he has no pain. after last tx, the neulasta Onpro malfunctioned and pt seemingly did not get the medication. Nevertheless, he seems to have done OK and a week later, when we followed up, pt did not want to come for labs. He reports some cough and dyspnea.  12/12/23: Pt reports severe fatigue. he denies fever, chills, diarrhea or constipation. He notes the neck mass is smaller. he does have some lingering cough and dyspnea.   Disease: Extensive stage small cell cancer   Pathology: Small cell cancer   12/5/23  HEAD/NECK: Right neck soft tissue mass is smaller with decreased activity. This now exhibits moderate uptake predominantly at its periphery and exhibits increasing calcification (2.7 x 2.1 cm, SUV 4.6, image 59, dedicated head and neck series, previously 5.2 x 3.6 cm, SUV 18.0). Adenoids, tonsils, nasopharynx, oropharynx, hypopharynx, larynx, and trachea without suspicious uptake, mass, or other anatomic abnormality. Tongue without suspicious findings. Vocal cords exhibit symmetric, physiologic activity.  Lymph nodes are again seen, with improved size and uptake. For example, a node again present posterior to the primary mass (0.7 x 0.5 cm, SUV 1.7, image 59, previously 1.2 x 0.9 cm, SUV 7.7).  Intensely FDG-avid LEFT LOWER lobe consolidation has enlarged since most recent CT from 11/21/2023. There are also variable changes in the distribution and intensity of previously seen lung nodules since the prior PET/CT. Differential diagnosis favors infection and/or worsening LEFT LOWER lobe aspiration due to the short time course, but malignancy is not excluded. Concurrent increasing slight prominence of some mediastinal nodes may be related to this process. Follow-up chest CT may be helpful to assess for change. If there suspicion for a separate pulmonary malignancy, tissue sampling may be employed. 2.  Response to therapy in the neck, with decreased activity and size of dominant RIGHT neck mass, with concurrent similar changes at lymph nodes. A LEFT scalp lesion appears to have resolved. 3.  Punctate focus of mild uptake at LEFT external ear, nonspecific.  He reports reduction in the size of the right-sided neck mass, he denies any difficulty with swallowing pain.  2/7/24 Presents for OTV. Completed fx 1/33. He is feeling well, some fatigue which he attributes to not sleeping well at night. Continues weekly chemotherapy. No pain, no difficulty swallowing. Reviewed RT schedule, potential side effects, skin care, and will meet with RD today.   2/14/2024 OTV Completed fx 5/33. Tolerating treatment well. Denies pain. Eating with no difficulty. Denies dysgeusia.  Skin and mouth care reviewed.   2/21/2024 OTV Completed fx 10/33. Reports no taste. 2 lb weight loss this week. C/o sores in mouth. Continues to smoke a few cigarettes a day. Encouraged to stop. Smoking cessation information refused.

## 2024-02-21 NOTE — DISEASE MANAGEMENT
[Clinical] : TNM Stage: c [IV] : IV [TTNM] : 3 [NTNM] : 2 [MTNM] : 0 [de-identified] : 2000 [de-identified] : 6817 [de-identified] : salivary gland

## 2024-02-25 NOTE — PHYSICAL EXAM
[Ambulatory and capable of all self care but unable to carry out any work activities] : Status 2- Ambulatory and capable of all self care but unable to carry out any work activities. Up and about more than 50% of waking hours [Thin] : thin [Normal] : affect appropriate [de-identified] : marked decrease in size of parotid mass.  [de-identified] : Barrel chest, diffusely diminished breath sounds, bibasilar crackles and wheezing

## 2024-02-25 NOTE — HISTORY OF PRESENT ILLNESS
[Disease: _____________________] : Disease: [unfilled] [de-identified] : Mr. 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   History:  Around middle of June 2023, he noticed small lump on his right side of the neck while shaving. Within weeks, the lump grew rapidly in size for which he saw his PCP in Aug who then referred him to Dr. Covarrubias for furtehr eval. He had some delay in seeking care due to deaths in the family and wife who was hospitalized. He endrosed unintentional weight loss 173 -> 152 over the past 8 months. The mass is fixed, immobile, tender to palpation and is associated with pressure in his ear. He denied hearing loss, SOB, dysphagia.   He is a current smoker (15 cigarettes per day) since age 13. Denied alcohol and illicit drug use.   Imaging and path:  8/3/2023 sonogram showed right neck mass corresponds to multiple hypoechoic nodules at level 2, measuring 6.7cm, 2.3cm, 3.4cm and 1.7cm  8/25/2023 MRI STN revealed a large right sided neck mass with lymphadenopathy is present highly suspicious for carcinoma with metastatic lymphadenopathy including in the right retropharyngeal node in the right jugular chain. The origin of the mass may be parotid.  9/14/2023- Intensely hypermetabolic RIGHT neck mass, with revisualized ipsilateral hypermetabolic lymph nodes. There are findings now suspicious for possible contralateral parmjit metastases, which were not present on the prior MRI. Mildly avid sub centimeter LEFT lung nodule may be inflammatory given the patient's emphysema, however further evaluation is suggested to exclude metastatic malignancy. Findings in the RIGHT LOWER lobe are favored to be nodular atelectasis.  9/15/2023- He underwent right neck mass, US guided FNA, path c/w round blue cell tumor 9/20/2023 - He underwent core biopsy of right neck 8.0 cm x 2 lymph node. Lymph A c/w metastatic small cell carcinoma. Lymph node B-necrotic lymph node   10/20/23: Patient seen today for follow up accompanied by his wife. He received C1 9/28-9/30 and tolerated overall well. They report that he experienced fatigue and tiredness for a few days following his treatment but every day improved. He mentions a few episodes of dizziness during that time. All of his symptoms have since resolved. He did not experience fever, SOB, N/V/D, pain, neuropathy. He notes that he takes OTC docusate BID for constipation. They report that the mass on his right neck has substantially decreased in size and also feel that the lesion on his scalp has also decreased in size and is barely noticeable now.   11/10/23: Pt reports for follow up. He notes significant decrease in size of the rt submandibular/parotid mass. He has been tolerating treatment well. He continues to have fatigue, and weakness. He has lost weight. Dizziness episodes have resolved. The scalp lesion has completely resolved.   12/1/23: On chemo since 9/28/2023. Has been feeling fatigued and has lost weight due to anorexia. feels is responding to Rx. The neck mass is very small now and he has no pain. after last tx, the neulasta Onpro malfunctioned and pt seemingly did not get the medication. Nevertheless, he seems to have done OK and a week later, when we followed up, pt did not want to come for labs. He reports some cough and dyspnea.   12/12/23: Pt reports severe fatigue. he denies fever, chills, diarrhea or constipation. He notes the neck mass is smaller. he does have some lingering cough and dyspnea.   1/9/24: Pt recently evaluated for consideration of XRT to R. 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 tissue. To date he has received induction chemotherapy   1/29/24: Feels tired, lost weigh, cough and expectorant sputum. He has pain in left lower ribs. Pleuritic CP  2/7/24: Pt returns for follow up. reports he is feeling much better. Appetite returning. Energy improved. Tolerating treatment. Pain resolved. COmpleted course of antibiotic for pneumonia;.  2/14/24: Pt feeling well. Appetite and energy much better. Pt denies complaint. Tolerating treatment well.  [ECOG Performance Status: 1 - Restricted in physically strenuous activity but ambulatory and able to carry out work of a light or sedentary nature] : Performance Status: 1 - Restricted in physically strenuous activity but ambulatory and able to carry out work of a light or sedentary nature, e.g., light house work, office work [de-identified] : Small cell cancer

## 2024-02-25 NOTE — PHYSICAL EXAM
[Ambulatory and capable of all self care but unable to carry out any work activities] : Status 2- Ambulatory and capable of all self care but unable to carry out any work activities. Up and about more than 50% of waking hours [Thin] : thin [de-identified] : marked decrease in size of parotid mass.  [Normal] : affect appropriate [de-identified] : Barrel chest, diffusely diminished breath sounds, bibasilar crackles and wheezing

## 2024-02-25 NOTE — REVIEW OF SYSTEMS
[Fatigue] : fatigue [Recent Change In Weight] : ~T recent weight change [Dry Eyes] : no dryness of the eyes [Vision Problems] : no vision problems [Dysphagia] : no dysphagia [Loss of Hearing] : no loss of hearing [Chest Pain] : no chest pain [Odynophagia] : no odynophagia [Shortness Of Breath] : shortness of breath [Palpitations] : no palpitations [Wheezing] : no wheezing [SOB on Exertion] : shortness of breath during exertion [Cough] : cough [Abdominal Pain] : no abdominal pain [Vomiting] : no vomiting [Diarrhea: Grade 0] : Diarrhea: Grade 0 [Constipation] : no constipation [Incontinence] : no incontinence [Dysuria] : no dysuria [Joint Stiffness] : no joint stiffness [Joint Pain] : no joint pain [Confused] : no confusion [Insomnia] : no insomnia [Dizziness] : no dizziness [Negative] : Allergic/Immunologic [FreeTextEntry2] : Improved [FreeTextEntry6] : Improved

## 2024-02-25 NOTE — REVIEW OF SYSTEMS
[Recent Change In Weight] : ~T recent weight change [Fatigue] : fatigue [Vision Problems] : no vision problems [Dry Eyes] : no dryness of the eyes [Dysphagia] : no dysphagia [Odynophagia] : no odynophagia [Loss of Hearing] : no loss of hearing [Palpitations] : no palpitations [Chest Pain] : no chest pain [Wheezing] : no wheezing [Shortness Of Breath] : shortness of breath [SOB on Exertion] : shortness of breath during exertion [Cough] : cough [Vomiting] : no vomiting [Abdominal Pain] : no abdominal pain [Dysuria] : no dysuria [Constipation] : no constipation [Diarrhea: Grade 0] : Diarrhea: Grade 0 [Joint Pain] : no joint pain [Incontinence] : no incontinence [Joint Stiffness] : no joint stiffness [Confused] : no confusion [Dizziness] : no dizziness [Insomnia] : no insomnia [Negative] : Allergic/Immunologic [FreeTextEntry2] : Improved [FreeTextEntry6] : Improved

## 2024-02-25 NOTE — HISTORY OF PRESENT ILLNESS
[Disease: _____________________] : Disease: [unfilled] [de-identified] : Mr. 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   History:  Around middle of June 2023, he noticed small lump on his right side of the neck while shaving. Within weeks, the lump grew rapidly in size for which he saw his PCP in Aug who then referred him to Dr. Covarrubias for furtehr eval. He had some delay in seeking care due to deaths in the family and wife who was hospitalized. He endrosed unintentional weight loss 173 -> 152 over the past 8 months. The mass is fixed, immobile, tender to palpation and is associated with pressure in his ear. He denied hearing loss, SOB, dysphagia.   He is a current smoker (15 cigarettes per day) since age 13. Denied alcohol and illicit drug use.   Imaging and path:  8/3/2023 sonogram showed right neck mass corresponds to multiple hypoechoic nodules at level 2, measuring 6.7cm, 2.3cm, 3.4cm and 1.7cm  8/25/2023 MRI STN revealed a large right sided neck mass with lymphadenopathy is present highly suspicious for carcinoma with metastatic lymphadenopathy including in the right retropharyngeal node in the right jugular chain. The origin of the mass may be parotid.  9/14/2023- Intensely hypermetabolic RIGHT neck mass, with revisualized ipsilateral hypermetabolic lymph nodes. There are findings now suspicious for possible contralateral parmjit metastases, which were not present on the prior MRI. Mildly avid sub centimeter LEFT lung nodule may be inflammatory given the patient's emphysema, however further evaluation is suggested to exclude metastatic malignancy. Findings in the RIGHT LOWER lobe are favored to be nodular atelectasis.  9/15/2023- He underwent right neck mass, US guided FNA, path c/w round blue cell tumor 9/20/2023 - He underwent core biopsy of right neck 8.0 cm x 2 lymph node. Lymph A c/w metastatic small cell carcinoma. Lymph node B-necrotic lymph node   10/20/23: Patient seen today for follow up accompanied by his wife. He received C1 9/28-9/30 and tolerated overall well. They report that he experienced fatigue and tiredness for a few days following his treatment but every day improved. He mentions a few episodes of dizziness during that time. All of his symptoms have since resolved. He did not experience fever, SOB, N/V/D, pain, neuropathy. He notes that he takes OTC docusate BID for constipation. They report that the mass on his right neck has substantially decreased in size and also feel that the lesion on his scalp has also decreased in size and is barely noticeable now.   11/10/23: Pt reports for follow up. He notes significant decrease in size of the rt submandibular/parotid mass. He has been tolerating treatment well. He continues to have fatigue, and weakness. He has lost weight. Dizziness episodes have resolved. The scalp lesion has completely resolved.   12/1/23: On chemo since 9/28/2023. Has been feeling fatigued and has lost weight due to anorexia. feels is responding to Rx. The neck mass is very small now and he has no pain. after last tx, the neulasta Onpro malfunctioned and pt seemingly did not get the medication. Nevertheless, he seems to have done OK and a week later, when we followed up, pt did not want to come for labs. He reports some cough and dyspnea.   12/12/23: Pt reports severe fatigue. he denies fever, chills, diarrhea or constipation. He notes the neck mass is smaller. he does have some lingering cough and dyspnea.   1/9/24: Pt recently evaluated for consideration of XRT to R. 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 tissue. To date he has received induction chemotherapy   1/29/24: Feels tired, lost weigh, cough and expectorant sputum. He has pain in left lower ribs. Pleuritic CP  2/7/24: Pt returns for follow up. reports he is feeling much better. Appetite returning. Energy improved. Tolerating treatment. Pain resolved. COmpleted course of antibiotic for pneumonia;.  2/14/24: Pt feeling well. Appetite and energy much better. Pt denies complaint. Tolerating treatment well.   2/21/24: Pt evaluated prior to treatment today. Feeling well. Mild sore throat. No other complaints.  [de-identified] : Small cell cancer  [ECOG Performance Status: 1 - Restricted in physically strenuous activity but ambulatory and able to carry out work of a light or sedentary nature] : Performance Status: 1 - Restricted in physically strenuous activity but ambulatory and able to carry out work of a light or sedentary nature, e.g., light house work, office work

## 2024-02-25 NOTE — ASSESSMENT
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 A primary has not been detected though given extensive and continuous smoking history and presence of LLL consolidation and opacity, a lung primary is reasonable.  Baseline MRI done on 10/28 shows no mets He started induction intent with carboplatin and etoposide and this started 9/28/2023 along with Neulasta which he tolerated well and has had a great response He saw Dr Bedolla for lung bx but it was decided to wait for the procedure till after next scan since the lesions may have responded to tx  #Small Cell Cancer (extensive stage; unknown primary) -s/p induction chemotherapy with reduction in neck mass.  -He was evaluated by Dr. Alarcon and he is going to receive 60Gy in 30 fx to neck mass.  -Most recent CT chest revealed a new SHAY 1.1 x 1.0 cm nodule but the additional nodules previously noted are stable in size and number.  -Lung findings have not been biopsied to date.  -Continue CCRT with weekly carboplatin both as a radiation sensitizer and as continued systemic approach to lung findings. Currently W#2 Additional Hydration weekly and prn CBC reviewed from today WBC 11.92 HGB 8.5 Plt 154. Monitor hgb closely. Transfuse PRN F/U CMP   OV weekly

## 2024-02-25 NOTE — ASSESSMENT
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 A primary has not been detected though given extensive and continuous smoking history and presence of LLL consolidation and opacity, a lung primary is reasonable.  Baseline MRI done on 10/28 shows no mets He started induction intent with carboplatin and etoposide and this started 9/28/2023 along with Neulasta which he tolerated well and has had a great response He saw Dr Bedolla for lung bx but it was decided to wait for the procedure till after next scan since the lesions may have responded to tx  #Small Cell Cancer (extensive stage; unknown primary) -s/p induction chemotherapy with reduction in neck mass.  -He was evaluated by Dr. Alarcon and he is going to receive 60Gy in 30 fx to neck mass.  -Most recent CT chest revealed a new SHAY 1.1 x 1.0 cm nodule but the additional nodules previously noted are stable in size and number.  -Lung findings have not been biopsied to date.  -Continue CCRT with weekly carboplatin both as a radiation sensitizer and as continued systemic approach to lung findings. Currently W#3 Additional Hydration weekly and prn CBC reviewed from today WBC 11.74 HGB 8.5 Plt 86 . Monitor hgb closely. Mild thrombocytopenia appreciated. Will treat today but hold for plts less than 80. Transfuse PRBC's PRN F/U CMP   OV weekly

## 2024-02-25 NOTE — REVIEW OF SYSTEMS
[Fatigue] : fatigue [Recent Change In Weight] : ~T recent weight change [Dry Eyes] : no dryness of the eyes [Vision Problems] : no vision problems [Odynophagia] : no odynophagia [Loss of Hearing] : no loss of hearing [Dysphagia] : no dysphagia [Chest Pain] : no chest pain [Palpitations] : no palpitations [Shortness Of Breath] : shortness of breath [Wheezing] : no wheezing [Cough] : cough [SOB on Exertion] : shortness of breath during exertion [Vomiting] : no vomiting [Abdominal Pain] : no abdominal pain [Constipation] : no constipation [Diarrhea: Grade 0] : Diarrhea: Grade 0 [Dysuria] : no dysuria [Incontinence] : no incontinence [Joint Pain] : no joint pain [Confused] : no confusion [Joint Stiffness] : no joint stiffness [Insomnia] : no insomnia [Dizziness] : no dizziness [Negative] : Allergic/Immunologic [FreeTextEntry2] : Improved [FreeTextEntry6] : Improved

## 2024-02-25 NOTE — PHYSICAL EXAM
[Restricted in physically strenuous activity but ambulatory and able to carry out work of a light or sedentary nature] : Status 1- Restricted in physically strenuous activity but ambulatory and able to carry out work of a light or sedentary nature, e.g., light house work, office work [Thin] : thin [Normal] : affect appropriate [de-identified] : Barrel chest, diffusely diminished breath sounds, bibasilar crackles and wheezing  [de-identified] : almost complete resolution of parotid mass.

## 2024-02-25 NOTE — HISTORY OF PRESENT ILLNESS
[Disease: _____________________] : Disease: [unfilled] [de-identified] : Mr. 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   History:  Around middle of June 2023, he noticed small lump on his right side of the neck while shaving. Within weeks, the lump grew rapidly in size for which he saw his PCP in Aug who then referred him to Dr. Covarrubias for furtehr eval. He had some delay in seeking care due to deaths in the family and wife who was hospitalized. He endrosed unintentional weight loss 173 -> 152 over the past 8 months. The mass is fixed, immobile, tender to palpation and is associated with pressure in his ear. He denied hearing loss, SOB, dysphagia.   He is a current smoker (15 cigarettes per day) since age 13. Denied alcohol and illicit drug use.   Imaging and path:  8/3/2023 sonogram showed right neck mass corresponds to multiple hypoechoic nodules at level 2, measuring 6.7cm, 2.3cm, 3.4cm and 1.7cm  8/25/2023 MRI STN revealed a large right sided neck mass with lymphadenopathy is present highly suspicious for carcinoma with metastatic lymphadenopathy including in the right retropharyngeal node in the right jugular chain. The origin of the mass may be parotid.  9/14/2023- Intensely hypermetabolic RIGHT neck mass, with revisualized ipsilateral hypermetabolic lymph nodes. There are findings now suspicious for possible contralateral parmjit metastases, which were not present on the prior MRI. Mildly avid sub centimeter LEFT lung nodule may be inflammatory given the patient's emphysema, however further evaluation is suggested to exclude metastatic malignancy. Findings in the RIGHT LOWER lobe are favored to be nodular atelectasis.  9/15/2023- He underwent right neck mass, US guided FNA, path c/w round blue cell tumor 9/20/2023 - He underwent core biopsy of right neck 8.0 cm x 2 lymph node. Lymph A c/w metastatic small cell carcinoma. Lymph node B-necrotic lymph node   10/20/23: Patient seen today for follow up accompanied by his wife. He received C1 9/28-9/30 and tolerated overall well. They report that he experienced fatigue and tiredness for a few days following his treatment but every day improved. He mentions a few episodes of dizziness during that time. All of his symptoms have since resolved. He did not experience fever, SOB, N/V/D, pain, neuropathy. He notes that he takes OTC docusate BID for constipation. They report that the mass on his right neck has substantially decreased in size and also feel that the lesion on his scalp has also decreased in size and is barely noticeable now.   11/10/23: Pt reports for follow up. He notes significant decrease in size of the rt submandibular/parotid mass. He has been tolerating treatment well. He continues to have fatigue, and weakness. He has lost weight. Dizziness episodes have resolved. The scalp lesion has completely resolved.   12/1/23: On chemo since 9/28/2023. Has been feeling fatigued and has lost weight due to anorexia. feels is responding to Rx. The neck mass is very small now and he has no pain. after last tx, the neulasta Onpro malfunctioned and pt seemingly did not get the medication. Nevertheless, he seems to have done OK and a week later, when we followed up, pt did not want to come for labs. He reports some cough and dyspnea.   12/12/23: Pt reports severe fatigue. he denies fever, chills, diarrhea or constipation. He notes the neck mass is smaller. he does have some lingering cough and dyspnea.   1/9/24: Pt recently evaluated for consideration of XRT to R. 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 tissue. To date he has received induction chemotherapy   1/29/24: Feels tired, lost weigh, cough and expectorant sputum. He has pain in left lower ribs. Pleuritic CP  2/7/24: Pt returns for follow up. reports he is feeling much better. Appetite returning. Energy improved. Tolerating treatment. Pain resolved. COmpleted course of antibiotic for pneumonia;.  2/14/24: Pt feeling well. Appetite and energy much better. Pt denies complaint. Tolerating treatment well.  [ECOG Performance Status: 1 - Restricted in physically strenuous activity but ambulatory and able to carry out work of a light or sedentary nature] : Performance Status: 1 - Restricted in physically strenuous activity but ambulatory and able to carry out work of a light or sedentary nature, e.g., light house work, office work [de-identified] : Small cell cancer

## 2024-02-28 NOTE — HISTORY OF PRESENT ILLNESS
[FreeTextEntry1] : Mr. 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 Currently undergoing radiation.   History: Around middle of June 2023, he noticed small lump on his right side of the neck while shaving. Within weeks, the lump grew rapidly in size for which he saw his PCP in Aug who then referred him to Dr. Covarrubias for furtehr eval. He had some delay in seeking care due to deaths in the family and wife who was hospitalized. He endrosed unintentional weight loss 173 -> 152 over the past 8 months. The mass is fixed, immobile, tender to palpation and is associated with pressure in his ear. He denied hearing loss, SOB, dysphagia.  He is a current smoker (15 cigarettes per day) since age 13. Denied alcohol and illicit drug use.  Imaging and path: 8/3/2023 sonogram showed right neck mass corresponds to multiple hypoechoic nodules at level 2, measuring 6.7cm, 2.3cm, 3.4cm and 1.7cm  8/25/2023 MRI STN revealed a large right sided neck mass with lymphadenopathy is present highly suspicious for carcinoma with metastatic lymphadenopathy including in the right retropharyngeal node in the right jugular chain. The origin of the mass may be parotid.  9/14/2023- Intensely hypermetabolic RIGHT neck mass, with revisualized ipsilateral hypermetabolic lymph nodes. There are findings now suspicious for possible contralateral parmjit metastases, which were not present on the prior MRI. Mildly avid sub centimeter LEFT lung nodule may be inflammatory given the patient's emphysema, however further evaluation is suggested to exclude metastatic malignancy. Findings in the RIGHT LOWER lobe are favored to be nodular atelectasis.  9/15/2023- He underwent right neck mass, US guided FNA, path c/w round blue cell tumor 9/20/2023 - He underwent core biopsy of right neck 8.0 cm x 2 lymph node. Lymph A c/w metastatic small cell carcinoma. Lymph node B-necrotic lymph node   10/20/23: Patient seen today for follow up accompanied by his wife. He received C1 9/28-9/30 and tolerated overall well. They report that he experienced fatigue and tiredness for a few days following his treatment but every day improved. He mentions a few episodes of dizziness during that time. All of his symptoms have since resolved. He did not experience fever, SOB, N/V/D, pain, neuropathy. He notes that he takes OTC docusate BID for constipation. They report that the mass on his right neck has substantially decreased in size and also feel that the lesion on his scalp has also decreased in size and is barely noticeable now.  11/10/23: Pt reports for follow up. He notes significant decrease in size of the rt submandibular/parotid mass. He has been tolerating treatment well. He continues to have fatigue, and weakness. He has lost weight. Dizziness episodes have resolved. The scalp lesion has completely resolved.  12/1/23: On chemo since 9/28/2023. Has been feeling fatigued and has lost weight due to anorexia. feels is responding to Rx. The neck mass is very small now and he has no pain. after last tx, the neulasta Onpro malfunctioned and pt seemingly did not get the medication. Nevertheless, he seems to have done OK and a week later, when we followed up, pt did not want to come for labs. He reports some cough and dyspnea.  12/12/23: Pt reports severe fatigue. he denies fever, chills, diarrhea or constipation. He notes the neck mass is smaller. he does have some lingering cough and dyspnea.   Disease: Extensive stage small cell cancer   Pathology: Small cell cancer   12/5/23  HEAD/NECK: Right neck soft tissue mass is smaller with decreased activity. This now exhibits moderate uptake predominantly at its periphery and exhibits increasing calcification (2.7 x 2.1 cm, SUV 4.6, image 59, dedicated head and neck series, previously 5.2 x 3.6 cm, SUV 18.0). Adenoids, tonsils, nasopharynx, oropharynx, hypopharynx, larynx, and trachea without suspicious uptake, mass, or other anatomic abnormality. Tongue without suspicious findings. Vocal cords exhibit symmetric, physiologic activity.  Lymph nodes are again seen, with improved size and uptake. For example, a node again present posterior to the primary mass (0.7 x 0.5 cm, SUV 1.7, image 59, previously 1.2 x 0.9 cm, SUV 7.7).  Intensely FDG-avid LEFT LOWER lobe consolidation has enlarged since most recent CT from 11/21/2023. There are also variable changes in the distribution and intensity of previously seen lung nodules since the prior PET/CT. Differential diagnosis favors infection and/or worsening LEFT LOWER lobe aspiration due to the short time course, but malignancy is not excluded. Concurrent increasing slight prominence of some mediastinal nodes may be related to this process. Follow-up chest CT may be helpful to assess for change. If there suspicion for a separate pulmonary malignancy, tissue sampling may be employed. 2.  Response to therapy in the neck, with decreased activity and size of dominant RIGHT neck mass, with concurrent similar changes at lymph nodes. A LEFT scalp lesion appears to have resolved. 3.  Punctate focus of mild uptake at LEFT external ear, nonspecific.  He reports reduction in the size of the right-sided neck mass, he denies any difficulty with swallowing pain.  2/7/24 Presents for OTV. Completed fx 1/33. He is feeling well, some fatigue which he attributes to not sleeping well at night. Continues weekly chemotherapy. No pain, no difficulty swallowing. Reviewed RT schedule, potential side effects, skin care, and will meet with MAD[facility] today.   2/14/2024 OTV Completed fx 5/33. Tolerating treatment well. Denies pain. Eating with no difficulty. Denies dysgeusia.  Skin and mouth care reviewed.   2/21/2024 OTV Completed fx 10/33. Reports no taste. 2 lb weight loss this week. C/o sores in mouth. Continues to smoke a few cigarettes a day. Encouraged to stop. Smoking cessation information refused.   2/28/2024 OTV. Completed fx 15/33. States eating, but still losing weight. 3lbs this week. Encouraged to stop smoking. Seen by MADHU Metcalf.

## 2024-02-28 NOTE — REVIEW OF SYSTEMS
[Dysphagia: Grade 0] : Dysphagia: Grade 0 [Fatigue: Grade 0] : Fatigue: Grade 0 [Mucositis Oral: Grade 0] : Mucositis Oral: Grade 0  [Xerostomia: Grade 0] : Xerostomia: Grade 0 [Oral Pain: Grade 0] : Oral Pain: Grade 0 [Dysgeusia: Grade 0] : Dysgeusia: Grade 0 [Hoarseness: Grade 0] : Hoarseness: Grade 0 [Dermatitis Radiation: Grade 0] : Dermatitis Radiation: Grade 0 [Skin Hyperpigmentation: Grade 0] : Skin Hyperpigmentation: Grade 0 [Fatigue: Grade 1 - Fatigue relieved by rest] : Fatigue: Grade 1 - Fatigue relieved by rest [Dysgeusia: Grade 2 - Altered taste with change in diet (e.g., oral supplements); noxious or unpleasant taste; loss of taste] : Dysgeusia: Grade 2 - Altered taste with change in diet (e.g., oral supplements); noxious or unpleasant taste; loss of taste [Dermatitis Radiation: Grade 1 - Faint erythema or dry desquamation] : Dermatitis Radiation: Grade 1 - Faint erythema or dry desquamation

## 2024-02-28 NOTE — DISEASE MANAGEMENT
[Clinical] : TNM Stage: c [IV] : IV [TTNM] : 3 [NTNM] : 2 [MTNM] : 0 [de-identified] : 8016 [de-identified] : 5901 [de-identified] : Salivary gland

## 2024-02-29 NOTE — HISTORY OF PRESENT ILLNESS
[Disease: _____________________] : Disease: [unfilled] [de-identified] : Small cell cancer  [de-identified] : Mr. 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   History:  Around middle of June 2023, he noticed small lump on his right side of the neck while shaving. Within weeks, the lump grew rapidly in size for which he saw his PCP in Aug who then referred him to Dr. Covarrubias for furtehr eval. He had some delay in seeking care due to deaths in the family and wife who was hospitalized. He endrosed unintentional weight loss 173 -> 152 over the past 8 months. The mass is fixed, immobile, tender to palpation and is associated with pressure in his ear. He denied hearing loss, SOB, dysphagia.   He is a current smoker (15 cigarettes per day) since age 13. Denied alcohol and illicit drug use.   Imaging and path:  8/3/2023 sonogram showed right neck mass corresponds to multiple hypoechoic nodules at level 2, measuring 6.7cm, 2.3cm, 3.4cm and 1.7cm  8/25/2023 MRI STN revealed a large right sided neck mass with lymphadenopathy is present highly suspicious for carcinoma with metastatic lymphadenopathy including in the right retropharyngeal node in the right jugular chain. The origin of the mass may be parotid.  9/14/2023- Intensely hypermetabolic RIGHT neck mass, with revisualized ipsilateral hypermetabolic lymph nodes. There are findings now suspicious for possible contralateral parmjit metastases, which were not present on the prior MRI. Mildly avid sub centimeter LEFT lung nodule may be inflammatory given the patient's emphysema, however further evaluation is suggested to exclude metastatic malignancy. Findings in the RIGHT LOWER lobe are favored to be nodular atelectasis.  9/15/2023- He underwent right neck mass, US guided FNA, path c/w round blue cell tumor 9/20/2023 - He underwent core biopsy of right neck 8.0 cm x 2 lymph node. Lymph A c/w metastatic small cell carcinoma. Lymph node B-necrotic lymph node   10/20/23: Patient seen today for follow up accompanied by his wife. He received C1 9/28-9/30 and tolerated overall well. They report that he experienced fatigue and tiredness for a few days following his treatment but every day improved. He mentions a few episodes of dizziness during that time. All of his symptoms have since resolved. He did not experience fever, SOB, N/V/D, pain, neuropathy. He notes that he takes OTC docusate BID for constipation. They report that the mass on his right neck has substantially decreased in size and also feel that the lesion on his scalp has also decreased in size and is barely noticeable now.   11/10/23: Pt reports for follow up. He notes significant decrease in size of the rt submandibular/parotid mass. He has been tolerating treatment well. He continues to have fatigue, and weakness. He has lost weight. Dizziness episodes have resolved. The scalp lesion has completely resolved.   12/1/23: On chemo since 9/28/2023. Has been feeling fatigued and has lost weight due to anorexia. feels is responding to Rx. The neck mass is very small now and he has no pain. after last tx, the neulasta Onpro malfunctioned and pt seemingly did not get the medication. Nevertheless, he seems to have done OK and a week later, when we followed up, pt did not want to come for labs. He reports some cough and dyspnea.   12/12/23: Pt reports severe fatigue. he denies fever, chills, diarrhea or constipation. He notes the neck mass is smaller. he does have some lingering cough and dyspnea.   1/9/24: Pt recently evaluated for consideration of XRT to R. 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 tissue. To date he has received induction chemotherapy   1/29/24: Feels tired, lost weigh, cough and expectorant sputum. He has pain in left lower ribs. Pleuritic CP  2/7/24: Pt returns for follow up. reports he is feeling much better. Appetite returning. Energy improved. Tolerating treatment. Pain resolved. COmpleted course of antibiotic for pneumonia;.  2/14/24: Pt feeling well. Appetite and energy much better. Pt denies complaint. Tolerating treatment well.   2/21/24: Pt evaluated prior to  treatment today. Feeling well. Mild sore throat. No other complaints.   2/28/24: Pt  presents for W4 CCRT with weekly carbo. He is feeling well. No new complaints. PLT today 52. No bleeding or bruising.  [ECOG Performance Status: 1 - Restricted in physically strenuous activity but ambulatory and able to carry out work of a light or sedentary nature] : Performance Status: 1 - Restricted in physically strenuous activity but ambulatory and able to carry out work of a light or sedentary nature, e.g., light house work, office work

## 2024-02-29 NOTE — ASSESSMENT
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 A primary has not been detected though given extensive and continuous smoking history and presence of LLL consolidation and opacity, a lung primary is reasonable.  Baseline MRI done on 10/28 shows no mets He started induction intent with carboplatin and etoposide and this started 9/28/2023 along with Neulasta which he tolerated well and has had a great response He saw Dr Bedolla for lung bx but it was decided to wait for the procedure till after next scan since the lesions may have responded to tx  #Small Cell Cancer (extensive stage; unknown primary) -s/p induction chemotherapy with reduction in neck mass.  -He was evaluated by Dr. Alarcon and he is going to receive 60Gy in 30 fx to neck mass.  -Most recent CT chest revealed a new SHAY 1.1 x 1.0 cm nodule but the additional nodules previously noted are stable in size and number.  -Lung findings have not been biopsied to date.  -Currently W#4 CCRT with weekly Carbo. Will hold Carbo today 2/2 thrombocytopenia PLT 52. IVF only today -Will continue additional Hydration weekly and prn CBC reviewed from today WBC 6.79  HGB 8.8 PLT 52 . Monitor hgb closely.  F/U CMP   OV weekly

## 2024-02-29 NOTE — REVIEW OF SYSTEMS
[Recent Change In Weight] : ~T recent weight change [Fatigue] : fatigue [Dry Eyes] : no dryness of the eyes [Vision Problems] : no vision problems [Dysphagia] : no dysphagia [Loss of Hearing] : no loss of hearing [Odynophagia] : no odynophagia [Chest Pain] : no chest pain [Palpitations] : no palpitations [Shortness Of Breath] : shortness of breath [Wheezing] : no wheezing [SOB on Exertion] : shortness of breath during exertion [Cough] : cough [Abdominal Pain] : no abdominal pain [Vomiting] : no vomiting [Constipation] : no constipation [Diarrhea: Grade 0] : Diarrhea: Grade 0 [Incontinence] : no incontinence [Dysuria] : no dysuria [Joint Pain] : no joint pain [Confused] : no confusion [Joint Stiffness] : no joint stiffness [Dizziness] : no dizziness [Insomnia] : no insomnia [Negative] : Heme/Lymph [FreeTextEntry2] : Improved [FreeTextEntry6] : Improved

## 2024-02-29 NOTE — PHYSICAL EXAM
[Restricted in physically strenuous activity but ambulatory and able to carry out work of a light or sedentary nature] : Status 1- Restricted in physically strenuous activity but ambulatory and able to carry out work of a light or sedentary nature, e.g., light house work, office work [Thin] : thin [Normal] : affect appropriate [de-identified] : Barrel chest, diffusely diminished breath sounds, [de-identified] : complete resolution of parotid mass.

## 2024-03-06 NOTE — REVIEW OF SYSTEMS
[Dysphagia: Grade 0] : Dysphagia: Grade 0 [Fatigue: Grade 1 - Fatigue relieved by rest] : Fatigue: Grade 1 - Fatigue relieved by rest [Mucositis Oral: Grade 0] : Mucositis Oral: Grade 0  [Xerostomia: Grade 0] : Xerostomia: Grade 0 [Dysgeusia: Grade 1- Altered taste but no change in diet] : Dysgeusia: Grade 1 - Altered taste but no change in diet [Oral Pain: Grade 1 - Mild pain] : Oral Pain: Grade 1 - Mild pain [Hoarseness: Grade 0] : Hoarseness: Grade 0 [Skin Hyperpigmentation: Grade 0] : Skin Hyperpigmentation: Grade 0 [Dermatitis Radiation: Grade 1 - Faint erythema or dry desquamation] : Dermatitis Radiation: Grade 1 - Faint erythema or dry desquamation

## 2024-03-06 NOTE — ASSESSMENT
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 A primary has not been detected though given extensive and continuous smoking history and presence of LLL consolidation and opacity, a lung primary is reasonable.  Baseline MRI done on 10/28 shows no mets He started induction intent with carboplatin and etoposide and this started 9/28/2023 along with Neulasta which he tolerated well and has had a great response He saw Dr Bedolla for lung bx but it was decided to wait for the procedure till after next scan since the lesions may have responded to tx  #Small Cell Cancer (extensive stage; unknown primary) -s/p induction chemotherapy with reduction in neck mass.  -He was evaluated by Dr. Alarcon and he is going to receive 60Gy in 30 fx to neck mass.  -Most recent CT chest revealed a new SHAY 1.1 x 1.0 cm nodule but the additional nodules previously noted are stable in size and number.  -Lung findings have not been biopsied to date.  -Currently W#5 CCRT with weekly Carbo. Will hold Carbo today 2/2 thrombocytopenia PLT 40. IVF only today. Carbo also held W#4 for same -Will continue additional Hydration weekly and prn CBC reviewed from today WBC 4.97  HGB 9.0 PLT 40. No bruising or bleeding.  F/U CMP   OV weekly

## 2024-03-06 NOTE — HISTORY OF PRESENT ILLNESS
[Disease: _____________________] : Disease: [unfilled] [de-identified] : Mr. 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   History:  Around middle of June 2023, he noticed small lump on his right side of the neck while shaving. Within weeks, the lump grew rapidly in size for which he saw his PCP in Aug who then referred him to Dr. Covarrubias for furtehr eval. He had some delay in seeking care due to deaths in the family and wife who was hospitalized. He endrosed unintentional weight loss 173 -> 152 over the past 8 months. The mass is fixed, immobile, tender to palpation and is associated with pressure in his ear. He denied hearing loss, SOB, dysphagia.   He is a current smoker (15 cigarettes per day) since age 13. Denied alcohol and illicit drug use.   Imaging and path:  8/3/2023 sonogram showed right neck mass corresponds to multiple hypoechoic nodules at level 2, measuring 6.7cm, 2.3cm, 3.4cm and 1.7cm  8/25/2023 MRI STN revealed a large right sided neck mass with lymphadenopathy is present highly suspicious for carcinoma with metastatic lymphadenopathy including in the right retropharyngeal node in the right jugular chain. The origin of the mass may be parotid.  9/14/2023- Intensely hypermetabolic RIGHT neck mass, with revisualized ipsilateral hypermetabolic lymph nodes. There are findings now suspicious for possible contralateral parmjit metastases, which were not present on the prior MRI. Mildly avid sub centimeter LEFT lung nodule may be inflammatory given the patient's emphysema, however further evaluation is suggested to exclude metastatic malignancy. Findings in the RIGHT LOWER lobe are favored to be nodular atelectasis.  9/15/2023- He underwent right neck mass, US guided FNA, path c/w round blue cell tumor 9/20/2023 - He underwent core biopsy of right neck 8.0 cm x 2 lymph node. Lymph A c/w metastatic small cell carcinoma. Lymph node B-necrotic lymph node   10/20/23: Patient seen today for follow up accompanied by his wife. He received C1 9/28-9/30 and tolerated overall well. They report that he experienced fatigue and tiredness for a few days following his treatment but every day improved. He mentions a few episodes of dizziness during that time. All of his symptoms have since resolved. He did not experience fever, SOB, N/V/D, pain, neuropathy. He notes that he takes OTC docusate BID for constipation. They report that the mass on his right neck has substantially decreased in size and also feel that the lesion on his scalp has also decreased in size and is barely noticeable now.   11/10/23: Pt reports for follow up. He notes significant decrease in size of the rt submandibular/parotid mass. He has been tolerating treatment well. He continues to have fatigue, and weakness. He has lost weight. Dizziness episodes have resolved. The scalp lesion has completely resolved.   12/1/23: On chemo since 9/28/2023. Has been feeling fatigued and has lost weight due to anorexia. feels is responding to Rx. The neck mass is very small now and he has no pain. after last tx, the neulasta Onpro malfunctioned and pt seemingly did not get the medication. Nevertheless, he seems to have done OK and a week later, when we followed up, pt did not want to come for labs. He reports some cough and dyspnea.   12/12/23: Pt reports severe fatigue. he denies fever, chills, diarrhea or constipation. He notes the neck mass is smaller. he does have some lingering cough and dyspnea.   1/9/24: Pt recently evaluated for consideration of XRT to R. 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 tissue. To date he has received induction chemotherapy   1/29/24: Feels tired, lost weigh, cough and expectorant sputum. He has pain in left lower ribs. Pleuritic CP  2/7/24: Pt returns for follow up. reports he is feeling much better. Appetite returning. Energy improved. Tolerating treatment. Pain resolved. COmpleted course of antibiotic for pneumonia;.  2/14/24: Pt feeling well. Appetite and energy much better. Pt denies complaint. Tolerating treatment well.   2/21/24: Pt evaluated prior to  treatment today. Feeling well. Mild sore throat. No other complaints.   2/28/24: Pt  presents for W4 CCRT with weekly carbo. He is feeling well. No new complaints. PLT today 52. No bleeding or bruising.   3/6/24: Pt  presents for W5 CCRT with weekly carbo. He is feeling well. No new complaints. PLT today 40. No bleeding or bruising. Some skin changes appreciated r. neck. Using aquafor. No pain.  [de-identified] : Small cell cancer  [ECOG Performance Status: 1 - Restricted in physically strenuous activity but ambulatory and able to carry out work of a light or sedentary nature] : Performance Status: 1 - Restricted in physically strenuous activity but ambulatory and able to carry out work of a light or sedentary nature, e.g., light house work, office work

## 2024-03-06 NOTE — REVIEW OF SYSTEMS
[Fatigue] : fatigue [Recent Change In Weight] : ~T recent weight change [Dry Eyes] : no dryness of the eyes [Vision Problems] : no vision problems [Dysphagia] : no dysphagia [Loss of Hearing] : no loss of hearing [Odynophagia] : no odynophagia [Chest Pain] : no chest pain [Palpitations] : no palpitations [Shortness Of Breath] : shortness of breath [Wheezing] : no wheezing [Cough] : cough [SOB on Exertion] : shortness of breath during exertion [Abdominal Pain] : no abdominal pain [Vomiting] : no vomiting [Constipation] : no constipation [Dysuria] : no dysuria [Diarrhea: Grade 0] : Diarrhea: Grade 0 [Incontinence] : no incontinence [Joint Pain] : no joint pain [Confused] : no confusion [Joint Stiffness] : no joint stiffness [Dizziness] : no dizziness [Insomnia] : no insomnia [Negative] : Allergic/Immunologic [FreeTextEntry2] : Improved [FreeTextEntry6] : Improved

## 2024-03-06 NOTE — VITALS
[Maximal Pain Intensity: 2/10] : 2/10 [Pain Description/Quality: ___] : Pain description/quality: [unfilled] [Least Pain Intensity: 0/10] : 0/10 [Pain Location: ___] : Pain Location: [unfilled] [Pain Duration: ___] : Pain duration: [unfilled] [NoTreatment Scheduled] : no treatment scheduled [Pain Interferes with ADLs] : Pain does not interfere with activities of daily living [80: Normal activity with effort; some signs or symptoms of disease.] : 80: Normal activity with effort; some signs or symptoms of disease.  [ECOG Performance Status: 1 - Restricted in physically strenuous activity but ambulatory and able to carry out work of a light or sedentary nature] : Performance Status: 1 - Restricted in physically strenuous activity but ambulatory and able to carry out work of a light or sedentary nature, e.g., light house work, office work

## 2024-03-06 NOTE — PHYSICAL EXAM
[Thin] : thin [Restricted in physically strenuous activity but ambulatory and able to carry out work of a light or sedentary nature] : Status 1- Restricted in physically strenuous activity but ambulatory and able to carry out work of a light or sedentary nature, e.g., light house work, office work [Normal] : affect appropriate [de-identified] : complete resolution of parotid mass. XRT skin changes R. neck [de-identified] : Barrel chest, diffusely diminished breath sounds,

## 2024-03-06 NOTE — HISTORY OF PRESENT ILLNESS
[FreeTextEntry1] : Mr. 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 Currently undergoing radiation.   History: Around middle of June 2023, he noticed small lump on his right side of the neck while shaving. Within weeks, the lump grew rapidly in size for which he saw his PCP in Aug who then referred him to Dr. Covarrubias for furtehr eval. He had some delay in seeking care due to deaths in the family and wife who was hospitalized. He endrosed unintentional weight loss 173 -> 152 over the past 8 months. The mass is fixed, immobile, tender to palpation and is associated with pressure in his ear. He denied hearing loss, SOB, dysphagia.  He is a current smoker (15 cigarettes per day) since age 13. Denied alcohol and illicit drug use.  Imaging and path: 8/3/2023 sonogram showed right neck mass corresponds to multiple hypoechoic nodules at level 2, measuring 6.7cm, 2.3cm, 3.4cm and 1.7cm  8/25/2023 MRI STN revealed a large right sided neck mass with lymphadenopathy is present highly suspicious for carcinoma with metastatic lymphadenopathy including in the right retropharyngeal node in the right jugular chain. The origin of the mass may be parotid.  9/14/2023- Intensely hypermetabolic RIGHT neck mass, with revisualized ipsilateral hypermetabolic lymph nodes. There are findings now suspicious for possible contralateral parmjit metastases, which were not present on the prior MRI. Mildly avid sub centimeter LEFT lung nodule may be inflammatory given the patient's emphysema, however further evaluation is suggested to exclude metastatic malignancy. Findings in the RIGHT LOWER lobe are favored to be nodular atelectasis.  9/15/2023- He underwent right neck mass, US guided FNA, path c/w round blue cell tumor 9/20/2023 - He underwent core biopsy of right neck 8.0 cm x 2 lymph node. Lymph A c/w metastatic small cell carcinoma. Lymph node B-necrotic lymph node   10/20/23: Patient seen today for follow up accompanied by his wife. He received C1 9/28-9/30 and tolerated overall well. They report that he experienced fatigue and tiredness for a few days following his treatment but every day improved. He mentions a few episodes of dizziness during that time. All of his symptoms have since resolved. He did not experience fever, SOB, N/V/D, pain, neuropathy. He notes that he takes OTC docusate BID for constipation. They report that the mass on his right neck has substantially decreased in size and also feel that the lesion on his scalp has also decreased in size and is barely noticeable now.  11/10/23: Pt reports for follow up. He notes significant decrease in size of the rt submandibular/parotid mass. He has been tolerating treatment well. He continues to have fatigue, and weakness. He has lost weight. Dizziness episodes have resolved. The scalp lesion has completely resolved.  12/1/23: On chemo since 9/28/2023. Has been feeling fatigued and has lost weight due to anorexia. feels is responding to Rx. The neck mass is very small now and he has no pain. after last tx, the neulasta Onpro malfunctioned and pt seemingly did not get the medication. Nevertheless, he seems to have done OK and a week later, when we followed up, pt did not want to come for labs. He reports some cough and dyspnea.  12/12/23: Pt reports severe fatigue. he denies fever, chills, diarrhea or constipation. He notes the neck mass is smaller. he does have some lingering cough and dyspnea.   Disease: Extensive stage small cell cancer   Pathology: Small cell cancer   12/5/23  HEAD/NECK: Right neck soft tissue mass is smaller with decreased activity. This now exhibits moderate uptake predominantly at its periphery and exhibits increasing calcification (2.7 x 2.1 cm, SUV 4.6, image 59, dedicated head and neck series, previously 5.2 x 3.6 cm, SUV 18.0). Adenoids, tonsils, nasopharynx, oropharynx, hypopharynx, larynx, and trachea without suspicious uptake, mass, or other anatomic abnormality. Tongue without suspicious findings. Vocal cords exhibit symmetric, physiologic activity.  Lymph nodes are again seen, with improved size and uptake. For example, a node again present posterior to the primary mass (0.7 x 0.5 cm, SUV 1.7, image 59, previously 1.2 x 0.9 cm, SUV 7.7).  Intensely FDG-avid LEFT LOWER lobe consolidation has enlarged since most recent CT from 11/21/2023. There are also variable changes in the distribution and intensity of previously seen lung nodules since the prior PET/CT. Differential diagnosis favors infection and/or worsening LEFT LOWER lobe aspiration due to the short time course, but malignancy is not excluded. Concurrent increasing slight prominence of some mediastinal nodes may be related to this process. Follow-up chest CT may be helpful to assess for change. If there suspicion for a separate pulmonary malignancy, tissue sampling may be employed. 2.  Response to therapy in the neck, with decreased activity and size of dominant RIGHT neck mass, with concurrent similar changes at lymph nodes. A LEFT scalp lesion appears to have resolved. 3.  Punctate focus of mild uptake at LEFT external ear, nonspecific.  He reports reduction in the size of the right-sided neck mass, he denies any difficulty with swallowing pain.  2/7/24 Presents for OTV. Completed fx 1/33. He is feeling well, some fatigue which he attributes to not sleeping well at night. Continues weekly chemotherapy. No pain, no difficulty swallowing. Reviewed RT schedule, potential side effects, skin care, and will meet with MAD[facility] today.   2/14/2024 OTV Completed fx 5/33. Tolerating treatment well. Denies pain. Eating with no difficulty. Denies dysgeusia.  Skin and mouth care reviewed.   2/21/2024 OTV Completed fx 10/33. Reports no taste. 2 lb weight loss this week. C/o sores in mouth. Continues to smoke a few cigarettes a day. Encouraged to stop. Smoking cessation information refused.   2/28/2024 OTV. Completed fx 15/33. States eating, but still losing weight. 3lbs this week. Encouraged to stop smoking. Seen by MADHU Metcalf.   3/6/2024 OTV. Completed fx 20/33. Maintaining weight. Reports mild intermittent discomfort to right side of mouth and lack of taste. Continues to smoke and we continue to encourage him to stop.

## 2024-03-06 NOTE — DISEASE MANAGEMENT
[Clinical] : TNM Stage: c [IV] : IV [TTNM] : 3 [NTNM] : 2 [MTNM] : 0 [de-identified] : 3899 [de-identified] : Salivary gland [de-identified] : 5840

## 2024-03-12 NOTE — REVIEW OF SYSTEMS
[Dysphagia: Grade 0] : Dysphagia: Grade 0 [Fatigue: Grade 1 - Fatigue relieved by rest] : Fatigue: Grade 1 - Fatigue relieved by rest [Mucositis Oral: Grade 0] : Mucositis Oral: Grade 0  [Xerostomia: Grade 0] : Xerostomia: Grade 0 [Oral Pain: Grade 1 - Mild pain] : Oral Pain: Grade 1 - Mild pain [Dysgeusia: Grade 1- Altered taste but no change in diet] : Dysgeusia: Grade 1 - Altered taste but no change in diet [Hoarseness: Grade 0] : Hoarseness: Grade 0 [Skin Hyperpigmentation: Grade 0] : Skin Hyperpigmentation: Grade 0 [Dermatitis Radiation: Grade 1 - Faint erythema or dry desquamation] : Dermatitis Radiation: Grade 1 - Faint erythema or dry desquamation

## 2024-03-13 NOTE — REVIEW OF SYSTEMS
[Fatigue] : fatigue [Recent Change In Weight] : ~T recent weight change [Dry Eyes] : no dryness of the eyes [Vision Problems] : no vision problems [Dysphagia] : no dysphagia [Loss of Hearing] : no loss of hearing [Odynophagia] : no odynophagia [Palpitations] : no palpitations [Chest Pain] : no chest pain [Shortness Of Breath] : shortness of breath [Wheezing] : no wheezing [SOB on Exertion] : shortness of breath during exertion [Cough] : cough [Abdominal Pain] : no abdominal pain [Vomiting] : no vomiting [Constipation] : no constipation [Dysuria] : no dysuria [Diarrhea: Grade 0] : Diarrhea: Grade 0 [Incontinence] : no incontinence [Joint Pain] : no joint pain [Joint Stiffness] : no joint stiffness [Confused] : no confusion [Dizziness] : no dizziness [Insomnia] : no insomnia [Negative] : Allergic/Immunologic [FreeTextEntry2] : worsening fatigue. Diminished appetite [FreeTextEntry6] : Improved

## 2024-03-13 NOTE — VITALS
[Least Pain Intensity: 0/10] : 0/10 [Maximal Pain Intensity: 2/10] : 2/10 [Pain Description/Quality: ___] : Pain description/quality: [unfilled] [Pain Location: ___] : Pain Location: [unfilled] [NoTreatment Scheduled] : no treatment scheduled [80: Normal activity with effort; some signs or symptoms of disease.] : 80: Normal activity with effort; some signs or symptoms of disease.  [ECOG Performance Status: 1 - Restricted in physically strenuous activity but ambulatory and able to carry out work of a light or sedentary nature] : Performance Status: 1 - Restricted in physically strenuous activity but ambulatory and able to carry out work of a light or sedentary nature, e.g., light house work, office work [Maximal Pain Intensity: 4/10] : 4/10 [Least Pain Intensity: 2/10] : 2/10 [Pain Duration: ___] : Pain duration: [unfilled] [Pain Interferes with ADLs] : Pain does not interfere with activities of daily living

## 2024-03-13 NOTE — ASSESSMENT
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 A primary has not been detected though given extensive and continuous smoking history and presence of LLL consolidation and opacity, a lung primary is reasonable.  Baseline MRI done on 10/28 shows no mets He started induction intent with carboplatin and etoposide and this started 9/28/2023 along with Neulasta which he tolerated well and has had a great response He saw Dr Bedolla for lung bx but it was decided to wait for the procedure till after next scan since the lesions may have responded to tx  #Small Cell Cancer (extensive stage; unknown primary) -s/p induction chemotherapy with reduction in neck mass.  -He was evaluated by Dr. Alarcon and he is going to receive 60Gy in 30 fx to neck mass.  -Most recent CT chest revealed a new SHAY 1.1 x 1.0 cm nodule but the additional nodules previously noted are stable in size and number.  -Lung findings have not been biopsied to date.  -Currently W#6 CCRT with weekly Carbo. Will hold Carbo today 2/2 thrombocytopenia PLT 37. Carbo also held W#4 and W#5 for same.  -Will continue additional Hydration weekly and prn CBC reviewed from today WBC 5.28 HGB 8.1 PLT 37. No bruising or bleeding. Concern for delayed recovery. Will transfuse 2 units this friday in lieu of hydration. Appointment arranged. Orders will be placed   F/U CMP, LDH, TSH   OV weekly

## 2024-03-13 NOTE — DISEASE MANAGEMENT
[Clinical] : TNM Stage: c [IV] : IV [TTNM] : 3 [NTNM] : 2 [MTNM] : 0 [de-identified] : 7590 [de-identified] : 5745 [de-identified] : Salivary gland

## 2024-03-13 NOTE — HISTORY OF PRESENT ILLNESS
[FreeTextEntry1] : Mr. 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 Currently undergoing radiation.   History: Around middle of June 2023, he noticed small lump on his right side of the neck while shaving. Within weeks, the lump grew rapidly in size for which he saw his PCP in Aug who then referred him to Dr. Covarrubias for furtehr eval. He had some delay in seeking care due to deaths in the family and wife who was hospitalized. He endrosed unintentional weight loss 173 -> 152 over the past 8 months. The mass is fixed, immobile, tender to palpation and is associated with pressure in his ear. He denied hearing loss, SOB, dysphagia.  He is a current smoker (15 cigarettes per day) since age 13. Denied alcohol and illicit drug use.  Imaging and path: 8/3/2023 sonogram showed right neck mass corresponds to multiple hypoechoic nodules at level 2, measuring 6.7cm, 2.3cm, 3.4cm and 1.7cm  8/25/2023 MRI [facility] revealed a large right sided neck mass with lymphadenopathy is present highly suspicious for carcinoma with metastatic lymphadenopathy including in the right retropharyngeal node in the right jugular chain. The origin of the mass may be parotid.  9/14/2023- Intensely hypermetabolic RIGHT neck mass, with revisualized ipsilateral hypermetabolic lymph nodes. There are findings now suspicious for possible contralateral parmjit metastases, which were not present on the prior MRI. Mildly avid sub centimeter LEFT lung nodule may be inflammatory given the patient's emphysema, however further evaluation is suggested to exclude metastatic malignancy. Findings in the RIGHT LOWER lobe are favored to be nodular atelectasis.  9/15/2023- He underwent right neck mass, US guided FNA, path c/w round blue cell tumor 9/20/2023 - He underwent core biopsy of right neck 8.0 cm x 2 lymph node. Lymph A c/w metastatic small cell carcinoma. Lymph node B-necrotic lymph node   10/20/23: Patient seen today for follow up accompanied by his wife. He received C1 9/28-9/30 and tolerated overall well. They report that he experienced fatigue and tiredness for a few days following his treatment but every day improved. He mentions a few episodes of dizziness during that time. All of his symptoms have since resolved. He did not experience fever, SOB, N/V/D, pain, neuropathy. He notes that he takes OTC docusate BID for constipation. They report that the mass on his right neck has substantially decreased in size and also feel that the lesion on his scalp has also decreased in size and is barely noticeable now.  11/10/23: Pt reports for follow up. He notes significant decrease in size of the rt submandibular/parotid mass. He has been tolerating treatment well. He continues to have fatigue, and weakness. He has lost weight. Dizziness episodes have resolved. The scalp lesion has completely resolved.  12/1/23: On chemo since 9/28/2023. Has been feeling fatigued and has lost weight due to anorexia. feels is responding to Rx. The neck mass is very small now and he has no pain. after last tx, the neulasta Onpro malfunctioned and pt seemingly did not get the medication. Nevertheless, he seems to have done OK and a week later, when we followed up, pt did not want to come for labs. He reports some cough and dyspnea.  12/12/23: Pt reports severe fatigue. he denies fever, chills, diarrhea or constipation. He notes the neck mass is smaller. he does have some lingering cough and dyspnea.   Disease: Extensive stage small cell cancer   Pathology: Small cell cancer   12/5/23  HEAD/NECK: Right neck soft tissue mass is smaller with decreased activity. This now exhibits moderate uptake predominantly at its periphery and exhibits increasing calcification (2.7 x 2.1 cm, SUV 4.6, image 59, dedicated head and neck series, previously 5.2 x 3.6 cm, SUV 18.0). Adenoids, tonsils, nasopharynx, oropharynx, hypopharynx, larynx, and trachea without suspicious uptake, mass, or other anatomic abnormality. Tongue without suspicious findings. Vocal cords exhibit symmetric, physiologic activity.  Lymph nodes are again seen, with improved size and uptake. For example, a node again present posterior to the primary mass (0.7 x 0.5 cm, SUV 1.7, image 59, previously 1.2 x 0.9 cm, SUV 7.7).  Intensely FDG-avid LEFT LOWER lobe consolidation has enlarged since most recent CT from 11/21/2023. There are also variable changes in the distribution and intensity of previously seen lung nodules since the prior PET/CT. Differential diagnosis favors infection and/or worsening LEFT LOWER lobe aspiration due to the short time course, but malignancy is not excluded. Concurrent increasing slight prominence of some mediastinal nodes may be related to this process. Follow-up chest CT may be helpful to assess for change. If there suspicion for a separate pulmonary malignancy, tissue sampling may be employed. 2.  Response to therapy in the neck, with decreased activity and size of dominant RIGHT neck mass, with concurrent similar changes at lymph nodes. A LEFT scalp lesion appears to have resolved. 3.  Punctate focus of mild uptake at LEFT external ear, nonspecific.  He reports reduction in the size of the right-sided neck mass, he denies any difficulty with swallowing pain.  2/7/24 Presents for OTV. Completed fx 1/33. He is feeling well, some fatigue which he attributes to not sleeping well at night. Continues weekly chemotherapy. No pain, no difficulty swallowing. Reviewed RT schedule, potential side effects, skin care, and will meet with [facility] today.   2/14/2024 OTV Completed fx 5/33. Tolerating treatment well. Denies pain. Eating with no difficulty. Denies dysgeusia.  Skin and mouth care reviewed.   2/21/2024 OTV Completed fx 10/33. Reports no taste. 2 lb weight loss this week. C/o sores in mouth. Continues to smoke a few cigarettes a day. Encouraged to stop. Smoking cessation information refused.   2/28/2024 OTV. Completed fx 15/33. States eating, but still losing weight. 3lbs this week. Encouraged to stop smoking. Seen by [facility] Metcalf.   3/6/2024 OTV. Completed fx 20/33. Maintaining weight. Reports mild intermittent discomfort to right side of mouth and lack of taste. Continues to smoke and we continue to encourage him to stop.   3/13/2024 OTV. Completed fx 25/33. Maintaining weight. Continues to have soreness to right side of tongue. Reports still smoking but has cut down.

## 2024-03-13 NOTE — HISTORY OF PRESENT ILLNESS
[Disease: _____________________] : Disease: [unfilled] [de-identified] : Small cell cancer  [de-identified] : Mr. 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   History:  Around middle of June 2023, he noticed small lump on his right side of the neck while shaving. Within weeks, the lump grew rapidly in size for which he saw his PCP in Aug who then referred him to Dr. Covarrubias for furtehr eval. He had some delay in seeking care due to deaths in the family and wife who was hospitalized. He endrosed unintentional weight loss 173 -> 152 over the past 8 months. The mass is fixed, immobile, tender to palpation and is associated with pressure in his ear. He denied hearing loss, SOB, dysphagia.   He is a current smoker (15 cigarettes per day) since age 13. Denied alcohol and illicit drug use.   Imaging and path:  8/3/2023 sonogram showed right neck mass corresponds to multiple hypoechoic nodules at level 2, measuring 6.7cm, 2.3cm, 3.4cm and 1.7cm  8/25/2023 MRI STN revealed a large right sided neck mass with lymphadenopathy is present highly suspicious for carcinoma with metastatic lymphadenopathy including in the right retropharyngeal node in the right jugular chain. The origin of the mass may be parotid.  9/14/2023- Intensely hypermetabolic RIGHT neck mass, with revisualized ipsilateral hypermetabolic lymph nodes. There are findings now suspicious for possible contralateral parmjit metastases, which were not present on the prior MRI. Mildly avid sub centimeter LEFT lung nodule may be inflammatory given the patient's emphysema, however further evaluation is suggested to exclude metastatic malignancy. Findings in the RIGHT LOWER lobe are favored to be nodular atelectasis.  9/15/2023- He underwent right neck mass, US guided FNA, path c/w round blue cell tumor 9/20/2023 - He underwent core biopsy of right neck 8.0 cm x 2 lymph node. Lymph A c/w metastatic small cell carcinoma. Lymph node B-necrotic lymph node   10/20/23: Patient seen today for follow up accompanied by his wife. He received C1 9/28-9/30 and tolerated overall well. They report that he experienced fatigue and tiredness for a few days following his treatment but every day improved. He mentions a few episodes of dizziness during that time. All of his symptoms have since resolved. He did not experience fever, SOB, N/V/D, pain, neuropathy. He notes that he takes OTC docusate BID for constipation. They report that the mass on his right neck has substantially decreased in size and also feel that the lesion on his scalp has also decreased in size and is barely noticeable now.   11/10/23: Pt reports for follow up. He notes significant decrease in size of the rt submandibular/parotid mass. He has been tolerating treatment well. He continues to have fatigue, and weakness. He has lost weight. Dizziness episodes have resolved. The scalp lesion has completely resolved.   12/1/23: On chemo since 9/28/2023. Has been feeling fatigued and has lost weight due to anorexia. feels is responding to Rx. The neck mass is very small now and he has no pain. after last tx, the neulasta Onpro malfunctioned and pt seemingly did not get the medication. Nevertheless, he seems to have done OK and a week later, when we followed up, pt did not want to come for labs. He reports some cough and dyspnea.   12/12/23: Pt reports severe fatigue. he denies fever, chills, diarrhea or constipation. He notes the neck mass is smaller. he does have some lingering cough and dyspnea.   1/9/24: Pt recently evaluated for consideration of XRT to R. 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 tissue. To date he has received induction chemotherapy   1/29/24: Feels tired, lost weigh, cough and expectorant sputum. He has pain in left lower ribs. Pleuritic CP  2/7/24: Pt returns for follow up. reports he is feeling much better. Appetite returning. Energy improved. Tolerating treatment. Pain resolved. COmpleted course of antibiotic for pneumonia;.  2/14/24: Pt feeling well. Appetite and energy much better. Pt denies complaint. Tolerating treatment well.   2/21/24: Pt evaluated prior to  treatment today. Feeling well. Mild sore throat. No other complaints.   2/28/24: Pt  presents for W4 CCRT with weekly carbo. He is feeling well. No new complaints. PLT today 52. No bleeding or bruising.   3/6/24: Pt  presents for W5 CCRT with weekly carbo. He is feeling well. No new complaints. PLT today 40. No bleeding or bruising. Some skin changes appreciated r. neck. Using aquafor. No pain.   3/13/24: Pt reports significant fatigue. Appetite diminished. Plts today still low (37). HGB 8.1. Has not had any chemotherapy since 2/21. Counts not recovering.  [ECOG Performance Status: 1 - Restricted in physically strenuous activity but ambulatory and able to carry out work of a light or sedentary nature] : Performance Status: 1 - Restricted in physically strenuous activity but ambulatory and able to carry out work of a light or sedentary nature, e.g., light house work, office work

## 2024-03-13 NOTE — PHYSICAL EXAM
[Restricted in physically strenuous activity but ambulatory and able to carry out work of a light or sedentary nature] : Status 1- Restricted in physically strenuous activity but ambulatory and able to carry out work of a light or sedentary nature, e.g., light house work, office work [Thin] : thin [Normal] : affect appropriate [de-identified] : Barrel chest, diffusely diminished breath sounds, [de-identified] : complete resolution of parotid mass. XRT skin changes R. neck

## 2024-03-15 PROBLEM — C07 CANCER OF PAROTID GLAND: Status: ACTIVE | Noted: 2023-01-01

## 2024-03-20 NOTE — HISTORY OF PRESENT ILLNESS
[Disease: _____________________] : Disease: [unfilled] [de-identified] : Mr. 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   History:  Around middle of June 2023, he noticed small lump on his right side of the neck while shaving. Within weeks, the lump grew rapidly in size for which he saw his PCP in Aug who then referred him to Dr. Covarrubias for furtehr eval. He had some delay in seeking care due to deaths in the family and wife who was hospitalized. He endrosed unintentional weight loss 173 -> 152 over the past 8 months. The mass is fixed, immobile, tender to palpation and is associated with pressure in his ear. He denied hearing loss, SOB, dysphagia.   He is a current smoker (15 cigarettes per day) since age 13. Denied alcohol and illicit drug use.   Imaging and path:  8/3/2023 sonogram showed right neck mass corresponds to multiple hypoechoic nodules at level 2, measuring 6.7cm, 2.3cm, 3.4cm and 1.7cm  8/25/2023 MRI STN revealed a large right sided neck mass with lymphadenopathy is present highly suspicious for carcinoma with metastatic lymphadenopathy including in the right retropharyngeal node in the right jugular chain. The origin of the mass may be parotid.  9/14/2023- Intensely hypermetabolic RIGHT neck mass, with revisualized ipsilateral hypermetabolic lymph nodes. There are findings now suspicious for possible contralateral parmjit metastases, which were not present on the prior MRI. Mildly avid sub centimeter LEFT lung nodule may be inflammatory given the patient's emphysema, however further evaluation is suggested to exclude metastatic malignancy. Findings in the RIGHT LOWER lobe are favored to be nodular atelectasis.  9/15/2023- He underwent right neck mass, US guided FNA, path c/w round blue cell tumor 9/20/2023 - He underwent core biopsy of right neck 8.0 cm x 2 lymph node. Lymph A c/w metastatic small cell carcinoma. Lymph node B-necrotic lymph node   10/20/23: Patient seen today for follow up accompanied by his wife. He received C1 9/28-9/30 and tolerated overall well. They report that he experienced fatigue and tiredness for a few days following his treatment but every day improved. He mentions a few episodes of dizziness during that time. All of his symptoms have since resolved. He did not experience fever, SOB, N/V/D, pain, neuropathy. He notes that he takes OTC docusate BID for constipation. They report that the mass on his right neck has substantially decreased in size and also feel that the lesion on his scalp has also decreased in size and is barely noticeable now.   11/10/23: Pt reports for follow up. He notes significant decrease in size of the rt submandibular/parotid mass. He has been tolerating treatment well. He continues to have fatigue, and weakness. He has lost weight. Dizziness episodes have resolved. The scalp lesion has completely resolved.   12/1/23: On chemo since 9/28/2023. Has been feeling fatigued and has lost weight due to anorexia. feels is responding to Rx. The neck mass is very small now and he has no pain. after last tx, the neulasta Onpro malfunctioned and pt seemingly did not get the medication. Nevertheless, he seems to have done OK and a week later, when we followed up, pt did not want to come for labs. He reports some cough and dyspnea.   12/12/23: Pt reports severe fatigue. he denies fever, chills, diarrhea or constipation. He notes the neck mass is smaller. he does have some lingering cough and dyspnea.   1/9/24: Pt recently evaluated for consideration of XRT to R. 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 tissue. To date he has received induction chemotherapy   1/29/24: Feels tired, lost weigh, cough and expectorant sputum. He has pain in left lower ribs. Pleuritic CP  2/7/24: Pt returns for follow up. reports he is feeling much better. Appetite returning. Energy improved. Tolerating treatment. Pain resolved. COmpleted course of antibiotic for pneumonia;.  2/14/24: Pt feeling well. Appetite and energy much better. Pt denies complaint. Tolerating treatment well.   2/21/24: Pt evaluated prior to  treatment today. Feeling well. Mild sore throat. No other complaints.   2/28/24: Pt  presents for W4 CCRT with weekly carbo. He is feeling well. No new complaints. PLT today 52. No bleeding or bruising.   3/6/24: Pt  presents for W5 CCRT with weekly carbo. He is feeling well. No new complaints. PLT today 40. No bleeding or bruising. Some skin changes appreciated r. neck. Using aquafor. No pain.   3/13/24: Pt reports significant fatigue. Appetite diminished. Plts today still low (37). HGB 8.1. Has not had any chemotherapy since 2/21. Counts not recovering.   3/20/24: Pt completed 30/33 fx XRT. Concurrent weekly carboplatin has been on hold 2/2 prolonged thrombocytopenia. Pt still thrombocytopenic. Plts today 49. No bruising or bleeding. Pt received 2 units PRBC's last friday. Hgb today 10.5. Pt reports fatigue and mouth sores. Lost 2 lbs. Pt using magic mouthwash and salt/soda rinses.  Pt reports decreased appetite 2/2 taste alterations. Able to drink liquids.  [de-identified] : Small cell cancer  [ECOG Performance Status: 1 - Restricted in physically strenuous activity but ambulatory and able to carry out work of a light or sedentary nature] : Performance Status: 1 - Restricted in physically strenuous activity but ambulatory and able to carry out work of a light or sedentary nature, e.g., light house work, office work

## 2024-03-20 NOTE — DISEASE MANAGEMENT
[Clinical] : TNM Stage: c [IV] : IV [TTNM] : 3 [NTNM] : 2 [MTNM] : 0 [de-identified] : 5245 [de-identified] : 6056 [de-identified] : Salivary gland

## 2024-03-20 NOTE — HISTORY OF PRESENT ILLNESS
[FreeTextEntry1] : Mr. 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 Currently undergoing radiation.   History: Around middle of June 2023, he noticed small lump on his right side of the neck while shaving. Within weeks, the lump grew rapidly in size for which he saw his PCP in Aug who then referred him to Dr. Covarrubias for furtehr eval. He had some delay in seeking care due to deaths in the family and wife who was hospitalized. He endrosed unintentional weight loss 173 -> 152 over the past 8 months. The mass is fixed, immobile, tender to palpation and is associated with pressure in his ear. He denied hearing loss, SOB, dysphagia.  He is a current smoker (15 cigarettes per day) since age 13. Denied alcohol and illicit drug use.  Imaging and path: 8/3/2023 sonogram showed right neck mass corresponds to multiple hypoechoic nodules at level 2, measuring 6.7cm, 2.3cm, 3.4cm and 1.7cm  8/25/2023 MRI [facility] revealed a large right sided neck mass with lymphadenopathy is present highly suspicious for carcinoma with metastatic lymphadenopathy including in the right retropharyngeal node in the right jugular chain. The origin of the mass may be parotid.  9/14/2023- Intensely hypermetabolic RIGHT neck mass, with revisualized ipsilateral hypermetabolic lymph nodes. There are findings now suspicious for possible contralateral parmjit metastases, which were not present on the prior MRI. Mildly avid sub centimeter LEFT lung nodule may be inflammatory given the patient's emphysema, however further evaluation is suggested to exclude metastatic malignancy. Findings in the RIGHT LOWER lobe are favored to be nodular atelectasis.  9/15/2023- He underwent right neck mass, US guided FNA, path c/w round blue cell tumor 9/20/2023 - He underwent core biopsy of right neck 8.0 cm x 2 lymph node. Lymph A c/w metastatic small cell carcinoma. Lymph node B-necrotic lymph node   10/20/23: Patient seen today for follow up accompanied by his wife. He received C1 9/28-9/30 and tolerated overall well. They report that he experienced fatigue and tiredness for a few days following his treatment but every day improved. He mentions a few episodes of dizziness during that time. All of his symptoms have since resolved. He did not experience fever, SOB, N/V/D, pain, neuropathy. He notes that he takes OTC docusate BID for constipation. They report that the mass on his right neck has substantially decreased in size and also feel that the lesion on his scalp has also decreased in size and is barely noticeable now.  11/10/23: Pt reports for follow up. He notes significant decrease in size of the rt submandibular/parotid mass. He has been tolerating treatment well. He continues to have fatigue, and weakness. He has lost weight. Dizziness episodes have resolved. The scalp lesion has completely resolved.  12/1/23: On chemo since 9/28/2023. Has been feeling fatigued and has lost weight due to anorexia. feels is responding to Rx. The neck mass is very small now and he has no pain. after last tx, the neulasta Onpro malfunctioned and pt seemingly did not get the medication. Nevertheless, he seems to have done OK and a week later, when we followed up, pt did not want to come for labs. He reports some cough and dyspnea.  12/12/23: Pt reports severe fatigue. he denies fever, chills, diarrhea or constipation. He notes the neck mass is smaller. he does have some lingering cough and dyspnea.   Disease: Extensive stage small cell cancer   Pathology: Small cell cancer   12/5/23  HEAD/NECK: Right neck soft tissue mass is smaller with decreased activity. This now exhibits moderate uptake predominantly at its periphery and exhibits increasing calcification (2.7 x 2.1 cm, SUV 4.6, image 59, dedicated head and neck series, previously 5.2 x 3.6 cm, SUV 18.0). Adenoids, tonsils, nasopharynx, oropharynx, hypopharynx, larynx, and trachea without suspicious uptake, mass, or other anatomic abnormality. Tongue without suspicious findings. Vocal cords exhibit symmetric, physiologic activity.  Lymph nodes are again seen, with improved size and uptake. For example, a node again present posterior to the primary mass (0.7 x 0.5 cm, SUV 1.7, image 59, previously 1.2 x 0.9 cm, SUV 7.7).  Intensely FDG-avid LEFT LOWER lobe consolidation has enlarged since most recent CT from 11/21/2023. There are also variable changes in the distribution and intensity of previously seen lung nodules since the prior PET/CT. Differential diagnosis favors infection and/or worsening LEFT LOWER lobe aspiration due to the short time course, but malignancy is not excluded. Concurrent increasing slight prominence of some mediastinal nodes may be related to this process. Follow-up chest CT may be helpful to assess for change. If there suspicion for a separate pulmonary malignancy, tissue sampling may be employed. 2.  Response to therapy in the neck, with decreased activity and size of dominant RIGHT neck mass, with concurrent similar changes at lymph nodes. A LEFT scalp lesion appears to have resolved. 3.  Punctate focus of mild uptake at LEFT external ear, nonspecific.  He reports reduction in the size of the right-sided neck mass, he denies any difficulty with swallowing pain.  2/7/24 Presents for OTV. Completed fx 1/33. He is feeling well, some fatigue which he attributes to not sleeping well at night. Continues weekly chemotherapy. No pain, no difficulty swallowing. Reviewed RT schedule, potential side effects, skin care, and will meet with [facility] today.   2/14/2024 OTV Completed fx 5/33. Tolerating treatment well. Denies pain. Eating with no difficulty. Denies dysgeusia.  Skin and mouth care reviewed.   2/21/2024 OTV Completed fx 10/33. Reports no taste. 2 lb weight loss this week. C/o sores in mouth. Continues to smoke a few cigarettes a day. Encouraged to stop. Smoking cessation information refused.   2/28/2024 OTV. Completed fx 15/33. States eating, but still losing weight. 3lbs this week. Encouraged to stop smoking. Seen by MADHU Metcalf.   3/6/2024 OTV. Completed fx 20/33. Maintaining weight. Reports mild intermittent discomfort to right side of mouth and lack of taste. Continues to smoke and we continue to encourage him to stop.   3/13/2024 OTV. Completed fx 25/33. Maintaining weight. Continues to have soreness to right side of tongue. Reports still smoking but has cut down.  3/20/2024 OTV. Completed fx 30/33. Weight loss of 2 pounds since last week. Pt endorses appetite is okay, takes Ensure at least once daily. . Mouth sores "not too bad, " using MWM and baking soda oral solution . Advised pt of smoking cessation. Dietician following.

## 2024-03-20 NOTE — REVIEW OF SYSTEMS
[Fatigue] : fatigue [Recent Change In Weight] : ~T recent weight change [Dry Eyes] : no dryness of the eyes [Vision Problems] : no vision problems [Dysphagia] : no dysphagia [Loss of Hearing] : no loss of hearing [Odynophagia] : no odynophagia [Chest Pain] : no chest pain [Palpitations] : no palpitations [Shortness Of Breath] : shortness of breath [Wheezing] : no wheezing [Cough] : cough [SOB on Exertion] : shortness of breath during exertion [Abdominal Pain] : no abdominal pain [Vomiting] : no vomiting [Constipation] : no constipation [Diarrhea: Grade 0] : Diarrhea: Grade 0 [Incontinence] : no incontinence [Dysuria] : no dysuria [Joint Pain] : no joint pain [Joint Stiffness] : no joint stiffness [Confused] : no confusion [Dizziness] : no dizziness [Insomnia] : no insomnia [Negative] : Allergic/Immunologic [FreeTextEntry2] : worsening fatigue. Diminished appetite. Taste alterations  [FreeTextEntry6] : Improved

## 2024-03-20 NOTE — ASSESSMENT
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 A primary has not been detected though given extensive and continuous smoking history and presence of LLL consolidation and opacity, a lung primary is reasonable.  Baseline MRI done on 10/28 shows no mets He started induction intent with carboplatin and etoposide and this started 9/28/2023 along with Neulasta which he tolerated well and has had a great response He saw Dr Bedolla for lung bx but it was decided to wait for the procedure till after next scan since the lesions may have responded to tx  #Small Cell Cancer (extensive stage; unknown primary) -s/p induction chemotherapy with carbo/etoposide with reduction in neck mass.  -He was evaluated by Dr. Alarcon and he is completing 60Gy in 33 fx to neck mass. He is 30/33 fx -Most recent CT chest revealed a new SHAY 1.1 x 1.0 cm nodule but the additional nodules previously noted are stable in size and number.  -Lung findings have not been biopsied to date.  -Currently W#7 CCRT with weekly Carbo.  Carbo has been on hold since 2/21  2/2 thrombocytopenia PLT 49 today. Carbo also held W#4, W#5, W#6 for same.  -Will give IVF today CBC reviewed from today  No bruising or bleeding. Concern for delayed recovery.  s/p 2 units PRBC last friday F/U CMP, LDH, TSH   OV 2 weeks

## 2024-03-20 NOTE — REVIEW OF SYSTEMS
[Dysphagia: Grade 0] : Dysphagia: Grade 0 [Fatigue: Grade 1 - Fatigue relieved by rest] : Fatigue: Grade 1 - Fatigue relieved by rest [Oral Pain: Grade 1 - Mild pain] : Oral Pain: Grade 1 - Mild pain [Xerostomia: Grade 0] : Xerostomia: Grade 0 [Dysgeusia: Grade 1- Altered taste but no change in diet] : Dysgeusia: Grade 1 - Altered taste but no change in diet [Hoarseness: Grade 0] : Hoarseness: Grade 0 [Skin Hyperpigmentation: Grade 0] : Skin Hyperpigmentation: Grade 0 [Dermatitis Radiation: Grade 1 - Faint erythema or dry desquamation] : Dermatitis Radiation: Grade 1 - Faint erythema or dry desquamation [Diarrhea: Grade 0] : Diarrhea: Grade 0 [Dyspepsia: Grade 0] : Dyspepsia: Grade 0 [Nausea: Grade 0] : Nausea: Grade 0 [Vomiting: Grade 0] : Vomiting: Grade 0 [Mucositis Oral: Grade 1 - Asymptomatic or mild symptoms; intervention not indicated] : Mucositis Oral: Grade 1 - Asymptomatic or mild symptoms; intervention not indicated [Headache: Grade 0] : Headache: Grade 0 [Dyspnea: Grade 0] : Dyspnea: Grade 0

## 2024-03-20 NOTE — PHYSICAL EXAM
[General Appearance - In No Acute Distress] : in no acute distress [Thin] : thin [Sclera] : the sclera and conjunctiva were normal [] : no respiratory distress [Respiration, Rhythm And Depth] : normal respiratory rhythm and effort [Auscultation Breath Sounds / Voice Sounds] : lungs were clear to auscultation bilaterally [Heart Sounds] : normal S1 and S2 [Bowel Sounds] : normal bowel sounds [Abdomen Soft] : soft [Nondistended] : nondistended [Abdomen Tenderness] : non-tender [No Focal Deficits] : no focal deficits [Normal] : no joint swelling, no clubbing or cyanosis of the fingernails and muscle strength and tone were normal [de-identified] : erythema in mouth, upper buccal mucositis [Oriented To Time, Place, And Person] : oriented to person, place, and time

## 2024-03-20 NOTE — PHYSICAL EXAM
[Restricted in physically strenuous activity but ambulatory and able to carry out work of a light or sedentary nature] : Status 1- Restricted in physically strenuous activity but ambulatory and able to carry out work of a light or sedentary nature, e.g., light house work, office work [Thin] : thin [Ulcers] : ulcers [Normal] : affect appropriate [de-identified] : R. tongue ulcer [de-identified] : complete resolution of parotid mass. XRT skin changes R. neck [de-identified] : Barrel chest, diffusely diminished breath sounds,

## 2024-05-03 ENCOUNTER — NON-APPOINTMENT (OUTPATIENT)
Age: 74
End: 2024-05-03

## 2024-05-08 ENCOUNTER — APPOINTMENT (OUTPATIENT)
Dept: RADIATION ONCOLOGY | Facility: CLINIC | Age: 74
End: 2024-05-08